# Patient Record
Sex: FEMALE | Race: BLACK OR AFRICAN AMERICAN | Employment: OTHER | URBAN - METROPOLITAN AREA
[De-identification: names, ages, dates, MRNs, and addresses within clinical notes are randomized per-mention and may not be internally consistent; named-entity substitution may affect disease eponyms.]

---

## 2017-07-11 ENCOUNTER — HOSPITAL ENCOUNTER (OUTPATIENT)
Dept: MAMMOGRAPHY | Age: 72
Discharge: HOME | End: 2017-07-11
Attending: INTERNAL MEDICINE
Payer: MEDICARE

## 2017-07-11 DIAGNOSIS — Z12.31 VISIT FOR SCREENING MAMMOGRAM: ICD-10-CM

## 2017-07-11 PROCEDURE — 77067 SCR MAMMO BI INCL CAD: CPT

## 2017-07-18 ENCOUNTER — OFFICE VISIT (OUTPATIENT)
Dept: CARDIOLOGY CLINIC | Age: 72
End: 2017-07-18

## 2017-07-18 VITALS
OXYGEN SATURATION: 99 % | WEIGHT: 157.8 LBS | SYSTOLIC BLOOD PRESSURE: 130 MMHG | RESPIRATION RATE: 16 BRPM | BODY MASS INDEX: 26.29 KG/M2 | HEIGHT: 65 IN | HEART RATE: 56 BPM | DIASTOLIC BLOOD PRESSURE: 70 MMHG

## 2017-07-18 DIAGNOSIS — Q61.3 POLYCYSTIC KIDNEY DISEASE: Chronic | ICD-10-CM

## 2017-07-18 DIAGNOSIS — I10 ESSENTIAL HYPERTENSION: ICD-10-CM

## 2017-07-18 DIAGNOSIS — I48.0 PAROXYSMAL ATRIAL FIBRILLATION (HCC): Primary | ICD-10-CM

## 2017-07-18 DIAGNOSIS — N18.9 CKD (CHRONIC KIDNEY DISEASE), UNSPECIFIED STAGE: ICD-10-CM

## 2017-07-18 NOTE — PROGRESS NOTES
Subjective/HPI:     Mac Roy is a 81 y.o. female is here for f/u appt. The patient denies chest pain/ shortness of breath, orthopnea, PND, LE edema, palpitations, syncope, presyncope or fatigue. Doing well. PCP Provider  Lon Balderas MD  Past Medical History:   Diagnosis Date    Arthritis     Chronic kidney disease     on transplant list    Gastrointestinal disorder     GERD    GERD (gastroesophageal reflux disease)     Hypertension     Polycystic kidney disease       Past Surgical History:   Procedure Laterality Date    ABDOMEN SURGERY PROC UNLISTED  9/15/15    Laparoscopic insertion of peritoneal dialysis catheter    HX GYN  1990    hysterectomy/[facility]/Dr Lamberto Frank     Allergies   Allergen Reactions    Ace Inhibitors Cough    Dihydro-Cp [Dihydrocodeine-Cpm-Pseudoephed] Unknown (comments)      Family History   Problem Relation Age of Onset    Hypertension Mother     Kidney Disease Mother     Cancer Mother      Lung & throat    Dementia Father     Diabetes Father     Cancer Sister      spinal      Current Outpatient Prescriptions   Medication Sig    aspirin (ASPIRIN) 325 mg tablet Take 325 mg by mouth daily.  labetalol (NORMODYNE) 200 mg tablet Take 200 mg by mouth three (3) times daily.  cloNIDine (CATAPRES) 0.2 mg/24 hr patch 1 Patch by TransDERmal route Every Saturday.  omeprazole (PRILOSEC OTC) 20 mg tablet Take 20 mg by mouth daily.  tacrolimus (PROGRAF) 1 mg capsule Take  by mouth every twelve (12) hours.  predniSONE (DELTASONE) 5 mg tablet Take 5 mg by mouth daily.  mycophenolate (CELLCEPT) 250 mg capsule Take 250 mg by mouth two (2) times a day.  potassium chloride (K-DUR, KLOR-CON) 20 mEq tablet Take  by mouth two (2) times a day. No current facility-administered medications for this visit.        Vitals:    07/18/17 1114 07/18/17 1115   BP: 130/80 130/70   Pulse: (!) 56    Resp: 16    SpO2: 99%    Weight: 157 lb 12.8 oz (71.6 kg)    Height: 5' 5\" (1.651 m)      Social History     Social History    Marital status:      Spouse name: N/A    Number of children: N/A    Years of education: N/A     Occupational History    Not on file. Social History Main Topics    Smoking status: Former Smoker     Packs/day: 0.25     Years: 12.00     Quit date: 8/19/1985    Smokeless tobacco: Never Used    Alcohol use No    Drug use: No    Sexual activity: Yes     Partners: Male     Other Topics Concern    Not on file     Social History Narrative       I have reviewed the nurses notes, vitals, problem list, allergy list, medical history, family, social history and medications. Review of Symptoms:    General: Pt denies excessive weight gain or loss. Pt is able to conduct ADL's  HEENT: Denies blurred vision, headaches, epistaxis and difficulty swallowing. Respiratory: Denies shortness of breath, GALAVIZ, wheezing or stridor. Cardiovascular: Denies precordial pain, palpitations, edema or PND  Gastrointestinal: Denies poor appetite, indigestion, abdominal pain or blood in stool  Urinary: Denies dysuria, pyuria  Musculoskeletal: Denies pain or swelling from muscles or joints  Neurologic: Denies tremor, paresthesias, or sensory motor disturbance  Skin: Denies rash, itching or texture change. Psych: Denies depression        Physical Exam:      General: Well developed, in no acute distress, cooperative and alert  HEENT: No carotid bruits, no JVD, trach is midline. Neck Supple, PEERL, EOM intact. Heart:  Normal S1/S2 negative S3 or S4. Irregular,+ADRIAN, no gallop or rub.   Respiratory: Clear bilaterally x 4, no wheezing or rales  Abdomen:   Soft, non-tender, no masses, bowel sounds are active.   Extremities:  No edema, normal cap refill, no cyanosis, atraumatic. Neuro: A&Ox3, speech clear, gait stable. Skin: Skin color is normal. No rashes or lesions.  Non diaphoretic  Vascular: 2+ pulses symmetric in all extremities    Cardiographics    ECG: SB, HR 57  When compared with EKG on 7/7/16 no significant changes noted    Results for orders placed or performed during the hospital encounter of 10/25/12   EKG, 12 LEAD, INITIAL   Result Value Ref Range    Ventricular Rate 70 BPM    Atrial Rate 60 BPM    QRS Duration 80 ms    Q-T Interval 414 ms    QTC Calculation (Bezet) 447 ms    Calculated R Axis 55 degrees    Calculated T Axis 26 degrees    Diagnosis       Atrial fibrillation  When compared with ECG of 27-OCT-2012 05:45,  No significant change was found  Confirmed by Bridgette Buenrostro ([id number]) on 10/28/2012 3:29:55 PM         Cardiology Labs:  No results found for: CHOL, CHOLX, CHLST, CHOLV, 571943, HDL, LDL, LDLC, DLDLP, Mary Grace Allison, CHHD, [facility]    Lab Results   Component Value Date/Time    Sodium 136 10/15/2013 11:21 AM    Potassium 4.3 10/15/2013 11:21 AM    Chloride 94 10/15/2013 11:21 AM    CO2 26 10/15/2013 11:21 AM    Anion gap 9 05/14/2013 11:38 AM    Glucose 98 10/15/2013 11:21 AM    BUN 39 10/15/2013 11:21 AM    Creatinine 2.87 10/15/2013 11:21 AM    BUN/Creatinine ratio 14 10/15/2013 11:21 AM    GFR est AA 19 10/15/2013 11:21 AM    GFR est non-AA 16 10/15/2013 11:21 AM    Calcium 9.3 10/15/2013 11:21 AM    AST (SGOT) 19 10/15/2013 11:21 AM    Alk. phosphatase 69 10/15/2013 11:21 AM    Protein, total 7.0 10/15/2013 11:21 AM    Albumin 4.5 10/15/2013 11:21 AM    Globulin 3.8 05/14/2013 11:38 AM    A-G Ratio 1.8 10/15/2013 11:21 AM    ALT (SGPT) 12 10/15/2013 11:21 AM           Assessment:     Assessment:     Dangelo Churchill was seen today for follow-up. Diagnoses and all orders for this visit:    Paroxysmal atrial fibrillation ([address]    Polycystic kidney disease  -     AMB POC EKG ROUTINE W/ 12 LEADS, INTER & REP    CKD (chronic kidney disease), unspecified stage        ICD-10-CM ICD-9-CM    1. Paroxysmal atrial fibrillation (HCC) I48.0 427.31    2.  Polycystic kidney disease Q61.3 753.12 AMB POC EKG ROUTINE W/ 12 LEADS, INTER & REP   3. CKD (chronic kidney disease), unspecified stage N18.9 585.9      Orders Placed This Encounter    AMB POC EKG ROUTINE W/ 12 LEADS, INTER & REP     Order Specific Question:   Reason for Exam:     Answer:   routine        Plan:   Pt presents today for her annual f/u appt and denies cardiac complaints.     1. HTN: remains at goal. No ACEI or HCTZ due to CKD. Continue current meds. 2. Paroxysmal atrial fib in setting of hypokalemia and hyponatremia: Denies palpitations. Remains in NSR. 3. CRI: f/u with Nephrology. 4. Diet and exercise.      F/U in one year    Esmer Sanchez NP      Pt seen and examined in details. Agree with NP A&P. D/w pt.      Juan Alberto Queen MD

## 2017-07-18 NOTE — MR AVS SNAPSHOT
Visit Information Date & Time Provider Department Dept. Phone Encounter #  
 7/18/2017 10:45 AM Markell Ledesma, [address] [facility] [phone] [id number] Follow-up Instructions Return in about 1 year (around 7/18/2018). Upcoming Health Maintenance Date Due Hepatitis C Screening 1945 DTaP/Tdap/Td series (1 - Tdap) 6/10/1968 FOBT Q 1 YEAR AGE 50-75 6/10/1997 ZOSTER VACCINE AGE 60> 6/10/2007 GLAUCOMA SCREENING Q2Y 6/10/2012 OSTEOPOROSIS SCREENING (DEXA) 6/10/2012 MEDICARE YEARLY EXAM 6/10/2012 INFLUENZA AGE 9 TO ADULT 8/1/2017 Pneumococcal 65+ Low/Medium Risk (2 of 2 - PPSV23) 10/1/2017 BREAST CANCER SCRN MAMMOGRAM 7/11/2019 Allergies as of 7/18/2017  Review Complete On: 7/18/2017 By: Markell Ledesma MD  
  
 Severity Noted Reaction Type Reactions Ace Inhibitors  10/12/2012    Cough Dihydro-cp [Dihydrocodeine-cpm-pseudoephed]  10/12/2012    Unknown (comments) Current Immunizations  Reviewed on 5/19/2015 Name Date Influenza Vaccine Split 10/1/2012 Pneumococcal Vaccine (Unspecified Type) 10/1/2012 Not reviewed this visit You Were Diagnosed With   
  
 Codes Comments Paroxysmal atrial fibrillation (HCC)    -  Primary ICD-10-CM: I48.0 ICD-9-CM: 427.31 Essential hypertension     ICD-10-CM: I10 
ICD-9-CM: 401.9 Polycystic kidney disease     ICD-10-CM: Q61.3 ICD-9-CM: 753.12 CKD (chronic kidney disease), unspecified stage     ICD-10-CM: N18.9 ICD-9-CM: 619. 9 Vitals BP Pulse Resp Height(growth percentile) Weight(growth percentile) SpO2  
 130/70 (BP 1 Location: Right arm, BP Patient Position: Sitting) (!) 56 16 5' 5\" (1.651 m) 157 lb 12.8 oz (71.6 kg) 99% BMI OB Status Smoking Status 26.26 kg/m2 Hysterectomy Former Smoker Vitals History BMI and BSA Data  Body Mass Index Body Surface Area  
 26.26 kg/m 2 1.81 m 2  
  
  
 Preferred Pharmacy Pharmacy Name Phone [facility] [address] [phone] Your Updated Medication List  
  
   
This list is accurate as of: 7/18/17 11:31 AM.  Always use your most recent med list.  
  
  
  
  
 aspirin 325 mg tablet Commonly known as:  ASPIRIN Take 325 mg by mouth daily. cloNIDine 0.2 mg/24 hr patch Commonly known as:  CATAPRES  
1 Patch by TransDERmal route Every Saturday. labetalol 200 mg tablet Commonly known as:  Spencer Talisha Take 200 mg by mouth three (3) times daily. mycophenolate mofetil 250 mg capsule Commonly known as:  CELLCEPT Take 250 mg by mouth two (2) times a day. omeprazole 20 mg tablet Commonly known as:  PRILOSEC OTC Take 20 mg by mouth daily. potassium chloride 20 mEq tablet Commonly known as:  K-DUR, KLOR-CON Take  by mouth two (2) times a day. predniSONE 5 mg tablet Commonly known as:  Jovon Fast Take 5 mg by mouth daily. tacrolimus 1 mg capsule Commonly known as:  PROGRAF Take  by mouth every twelve (12) hours. We Performed the Following AMB POC EKG ROUTINE W/ 12 LEADS, INTER & REP [16321 CPT(R)] Follow-up Instructions Return in about 1 year (around 7/18/2018). Introducing [facility]! Luke Rios introduces [employer] patient portal. Now you can access parts of your medical record, email your doctor's office, and request medication refills online. 1. In your internet browser, go to [email or web address] 2. Click on the First Time User? Click Here link in the Sign In box. You will see the New Member Sign Up page. 3. Enter your [employer] Access Code exactly as it appears below. You will not need to use this code after youve completed the sign-up process. If you do not sign up before the expiration date, you must request a new code.  
 
· [employer] Access Code: [email or web address] 
 Expires: 10/9/2017 10:33 AM 
 
4. Enter the last four digits of your Social Security Number (xxxx) and Date of Birth (mm/dd/yyyy) as indicated and click Submit. You will be taken to the next sign-up page. 5. Create a [employer] ID. This will be your [employer] login ID and cannot be changed, so think of one that is secure and easy to remember. 6. Create a [employer] password. You can change your password at any time. 7. Enter your Password Reset Question and Answer. This can be used at a later time if you forget your password. 8. Enter your e-mail address. You will receive e-mail notification when new information is available in [address] 9. Click Sign Up. You can now view and download portions of your medical record. 10. Click the Download Summary menu link to download a portable copy of your medical information. If you have questions, please visit the Frequently Asked Questions section of the [employer] website. Remember, [employer] is NOT to be used for urgent needs. For medical emergencies, dial 911. Now available from your iPhone and Android! Please provide this summary of care documentation to your next provider. Your primary care clinician is listed as Whitney Kuhn. If you have any questions after today's visit, please call [phone].

## 2018-07-31 ENCOUNTER — HOSPITAL ENCOUNTER (OUTPATIENT)
Dept: MAMMOGRAPHY | Age: 73
Discharge: HOME | End: 2018-07-31
Attending: INTERNAL MEDICINE
Payer: MEDICARE

## 2018-07-31 DIAGNOSIS — Z12.31 VISIT FOR SCREENING MAMMOGRAM: ICD-10-CM

## 2018-07-31 PROCEDURE — 77067 SCR MAMMO BI INCL CAD: CPT

## 2019-05-02 ENCOUNTER — OFFICE VISIT (OUTPATIENT)
Dept: CARDIOLOGY CLINIC | Age: 74
End: 2019-05-02

## 2019-05-02 VITALS
DIASTOLIC BLOOD PRESSURE: 98 MMHG | BODY MASS INDEX: 28.41 KG/M2 | RESPIRATION RATE: 18 BRPM | WEIGHT: 170.5 LBS | HEIGHT: 65 IN | OXYGEN SATURATION: 98 % | HEART RATE: 52 BPM | SYSTOLIC BLOOD PRESSURE: 160 MMHG

## 2019-05-02 DIAGNOSIS — I34.0 NON-RHEUMATIC MITRAL REGURGITATION: ICD-10-CM

## 2019-05-02 DIAGNOSIS — I48.0 PAROXYSMAL ATRIAL FIBRILLATION (HCC): Primary | ICD-10-CM

## 2019-05-02 DIAGNOSIS — I10 ESSENTIAL HYPERTENSION: ICD-10-CM

## 2019-05-02 NOTE — PROGRESS NOTES
1. Have you been to the ER, urgent care clinic since your last visit? Hospitalized since your last visit? No.    2. Have you seen or consulted any other health care providers outside of the [address] [place] [address] since your last visit? Include any pap smears or colon screening. ENT Dileep Sands NP-[facility].       Chief Complaint   Patient presents with    Follow-up     last seen in 2017- seen by ENT today and noted irregular heartbeat-pt denies any cardiac symptoms

## 2019-05-02 NOTE — PROGRESS NOTES
JEANNIE Shanks-BC    Subjective/HPI:     Ms. Roy is a 72 y.o. female is here for overdue f/u. She has a PMHx of PAF, mitral regurgitation, HTN, HLD and polycystic kidney disease s/p kidney transplant. She is doing well. She denies complaints of chest pains, dizziness, orthopnea, PND or edema. She denies shortness of breath or palpitation symptoms. She checks BP at home 2x/weekly. Home readings average 130s/80s. She continues to follow with nephrology and transplant physician at [facility].          PCP Provider  Deepa Hummel MD  Past Medical History:   Diagnosis Date    Arthritis     Chronic kidney disease     on transplant list    Gastrointestinal disorder     GERD    GERD (gastroesophageal reflux disease)     Hypertension     Polycystic kidney disease       Past Surgical History:   Procedure Laterality Date    ABDOMEN SURGERY PROC UNLISTED  9/15/15    Laparoscopic insertion of peritoneal dialysis catheter    HX GYN      hysterectomy/[facility]/Dr Christina Mckeon     Family History   Problem Relation Age of Onset    Hypertension Mother     Kidney Disease Mother     Cancer Mother         Lung & throat    Dementia Father     Diabetes Father     Cancer Sister         spinal     Social History     Socioeconomic History    Marital status:      Spouse name: Not on file    Number of children: Not on file    Years of education: Not on file    Highest education level: Not on file   Occupational History    Not on file   Social Needs    Financial resource strain: Not on file    Food insecurity:     Worry: Not on file     Inability: Not on file    Transportation needs:     Medical: Not on file     Non-medical: Not on file   Tobacco Use    Smoking status: Former Smoker     Packs/day: 0.25     Years: 12.00     Pack years: 3.00     Types: Cigarettes     Last attempt to quit: 1985     Years since quittin.7    Smokeless tobacco: Never Used Substance and Sexual Activity    Alcohol use: No    Drug use: No    Sexual activity: Yes     Partners: Male   Lifestyle    Physical activity:     Days per week: Not on file     Minutes per session: Not on file    Stress: Not on file   Relationships    Social connections:     Talks on phone: Not on file     Gets together: Not on file     Attends Hoahaoism service: Not on file     Active member of club or organization: Not on file     Attends meetings of clubs or organizations: Not on file     Relationship status: Not on file    Intimate partner violence:     Fear of current or ex partner: Not on file     Emotionally abused: Not on file     Physically abused: Not on file     Forced sexual activity: Not on file   Other Topics Concern    Not on file   Social History Narrative    Not on file       Allergies   Allergen Reactions    Ace Inhibitors Cough    Dihydro-Cp [Dihydrocodeine-Cpm-Pseudoephed] Unknown (comments)        Current Outpatient Medications   Medication Sig    aspirin (ASPIRIN) 325 mg tablet Take 325 mg by mouth daily.  labetalol (NORMODYNE) 200 mg tablet Take 200 mg by mouth three (3) times daily.  cloNIDine (CATAPRES) 0.2 mg/24 hr patch 1 Patch by TransDERmal route Every Saturday.  omeprazole (PRILOSEC OTC) 20 mg tablet Take 20 mg by mouth daily.  tacrolimus (PROGRAF) 1 mg capsule Take  by mouth every twelve (12) hours. Indications: 2 caps in am and 3 caps in pm    predniSONE (DELTASONE) 5 mg tablet Take 5 mg by mouth daily.  mycophenolate (CELLCEPT) 250 mg capsule Take 250 mg by mouth two (2) times a day.  potassium chloride (K-DUR, KLOR-CON) 20 mEq tablet Take  by mouth two (2) times a day. No current facility-administered medications for this visit. I have reviewed the problem list, allergy list, medical history, family, social history and medications. Review of Symptoms:    Review of Systems   Constitutional: Negative for chills, fever and weight loss. HENT: Negative for nosebleeds. Eyes: Negative for blurred vision and double vision. Respiratory: Negative for cough, shortness of breath and wheezing. Cardiovascular: Negative for chest pain, palpitations, orthopnea, leg swelling and PND. Gastrointestinal: Negative for abdominal pain, blood in stool, diarrhea, nausea and vomiting. Musculoskeletal: Negative for joint pain. Skin: Negative for rash. Neurological: Negative for dizziness, tingling and loss of consciousness. Endo/Heme/Allergies: Does not bruise/bleed easily. Physical Exam:      General: Well developed, in no acute distress, cooperative and alert  HEENT: No carotid bruits, no JVD, trach is midline. Neck Supple, PEERL, EOM intact. Heart:  reg rate and rhythm; normal S1/S2; no murmurs, gallops or rubs. Respiratory: Clear bilaterally x 4, no wheezing or rales  Abdomen:   Soft, non-tender, no distention, no masses. + BS. Extremities:  Normal cap refill, no cyanosis, atraumatic. No edema. Neuro: A&Ox3, speech clear, gait stable. Skin: Skin color is normal. No rashes or lesions.  Non diaphoretic  Vascular: 2+ pulses symmetric in all extremities    Vitals:    05/02/19 1511 05/02/19 1526   BP: (!) 168/100 (!) 160/98   Pulse: (!) 52    Resp: 18    SpO2: 98%    Weight: 170 lb 8 oz (77.3 kg)    Height: 5' 5\" (1.651 m)        Cardiographics    ECG: sinus bradycardia with PACs  Results for orders placed or performed during the hospital encounter of 10/25/12   EKG, 12 LEAD, INITIAL   Result Value Ref Range    Ventricular Rate 70 BPM    Atrial Rate 60 BPM    QRS Duration 80 ms    Q-T Interval 414 ms    QTC Calculation (Bezet) 447 ms    Calculated R Axis 55 degrees    Calculated T Axis 26 degrees    Diagnosis       Atrial fibrillation  When compared with ECG of 27-OCT-2012 05:45,  No significant change was found  Confirmed by Melia Alicea ([id number]) on 10/28/2012 3:29:55 PM       Cardiology Labs:  No results found for: CHOL, CHOLX, CHLST, Wendall Delay, HDL, LDL, LDLC, DLDLP, TGLX, TRIGL, TRIGP, CHHD, CHHDX    Lab Results   Component Value Date/Time    Sodium 136 10/15/2013 11:21 AM    Potassium 4.3 10/15/2013 11:21 AM    Chloride 94 (L) 10/15/2013 11:21 AM    CO2 26 10/15/2013 11:21 AM    Anion gap 9 05/14/2013 11:38 AM    Glucose 98 10/15/2013 11:21 AM    BUN 39 (H) 10/15/2013 11:21 AM    Creatinine 2.87 (H) 10/15/2013 11:21 AM    BUN/Creatinine ratio 14 10/15/2013 11:21 AM    GFR est AA 19 (L) 10/15/2013 11:21 AM    GFR est non-AA 16 (L) 10/15/2013 11:21 AM    Calcium 9.3 10/15/2013 11:21 AM    Bilirubin, total 0.3 10/15/2013 11:21 AM    AST (SGOT) 19 10/15/2013 11:21 AM    Alk. phosphatase 69 10/15/2013 11:21 AM    Protein, total 7.0 10/15/2013 11:21 AM    Albumin 4.5 10/15/2013 11:21 AM    Globulin 3.8 05/14/2013 11:38 AM    A-G Ratio 1.8 10/15/2013 11:21 AM    ALT (SGPT) 12 10/15/2013 11:21 AM           Assessment:     Assessment:       ICD-10-CM ICD-9-CM    1. Paroxysmal atrial fibrillation (HCC) I48.0 427.31 AMB POC EKG ROUTINE W/ 12 LEADS, INTER & REP   2. Non-rheumatic mitral regurgitation I34.0 424.0 ECHO ADULT COMPLETE   3. Essential hypertension I10 401.9         Plan:     1. Paroxysmal atrial fibrillation (HCC)  No recurrence of A fib; continue labetolol and ASA therapy. 2. Non-rheumatic mitral regurgitation  Echo in 10/2012 with preserved LVEF 60% with mild-mod MR and mildly dilated RV  Continue present medical management  Repeat echocardiogram.    3. Essential hypertension  BP well controlled at home. Continue with present medical management. Could not be on ACEi/ARB previously due to ESRD, but since renal transplant reports serum Cr 1.1-1.2. Norberto Morris NP      Patient seen and examined by me with nurse practitioner. Kayla Callahan personally performed all components of the history, physical, and medical decision making and agree with the assessment and plan with minor modifications as noted.     - Previously paroxysmal atrial fib in setting of hypokalemia and hyponatremia.   - s/p renal transplant    Scott Walters MD

## 2019-05-23 ENCOUNTER — TELEPHONE (OUTPATIENT)
Dept: CARDIOLOGY CLINIC | Age: 74
End: 2019-05-23

## 2019-05-23 NOTE — TELEPHONE ENCOUNTER
----- Message from Abby Abarca NP sent at 5/23/2019  8:17 AM EDT -----  Cite Ra Cruz,    Please call the patient and inform her echocardiogram showed normal ejection fraction 60-65%. No concern for heart failure; her leaky mitral valve is not leaking as much anymore, which is great.     Thanks,  [employer]

## 2019-08-08 ENCOUNTER — HOSPITAL ENCOUNTER (OUTPATIENT)
Dept: MAMMOGRAPHY | Age: 74
Discharge: HOME | End: 2019-08-08
Attending: INTERNAL MEDICINE
Payer: MEDICARE

## 2019-08-08 DIAGNOSIS — Z12.39 SCREENING BREAST EXAMINATION: ICD-10-CM

## 2019-08-08 PROCEDURE — 77067 SCR MAMMO BI INCL CAD: CPT

## 2020-11-25 ENCOUNTER — HOSPITAL ENCOUNTER (OUTPATIENT)
Dept: MAMMOGRAPHY | Age: 75
Discharge: HOME | End: 2020-11-25
Attending: INTERNAL MEDICINE
Payer: MEDICARE

## 2020-11-25 DIAGNOSIS — Z12.31 VISIT FOR SCREENING MAMMOGRAM: ICD-10-CM

## 2020-11-25 PROCEDURE — 77067 SCR MAMMO BI INCL CAD: CPT

## 2021-01-13 ENCOUNTER — VIRTUAL VISIT (OUTPATIENT)
Dept: ENDOCRINOLOGY | Age: 76
End: 2021-01-13
Payer: MEDICARE

## 2021-01-13 DIAGNOSIS — M85.9 DISORDER OF BONE DENSITY AND STRUCTURE, UNSPECIFIED: ICD-10-CM

## 2021-01-13 DIAGNOSIS — Z94.0 HISTORY OF RENAL TRANSPLANT: ICD-10-CM

## 2021-01-13 DIAGNOSIS — M85.80 OSTEOPENIA, UNSPECIFIED LOCATION: Primary | ICD-10-CM

## 2021-01-13 PROCEDURE — 99204 OFFICE O/P NEW MOD 45 MIN: CPT | Performed by: INTERNAL MEDICINE

## 2021-01-13 RX ORDER — DICLOFENAC SODIUM 10 MG/G
GEL TOPICAL
COMMUNITY
End: 2021-03-01 | Stop reason: ALTCHOICE

## 2021-01-13 NOTE — PROGRESS NOTES
Chief Complaint   Patient presents with    Osteopenia      Notes are in CC  DOXY:  [email or web address]    New Patient    Other     [facility]     History of Present Illness: Silva Roy is a 70 y.o. female with a past medical history significant for autosomal dominant polycystic kidney disease status post kidney transplant 01/2016 presenting in referral from Jamey Smith MD for discussion related to osteopenia. Is taking prednisone 5mg daily, has been taking since transplant. Previously took fosamax, PCP recommended she stop it in 2011- at that time had been taking it for >5 years. Menarche occurred age 14, menopause in her 45s. Creatinine was 1.2 on most recent assessment, last labs were in November. Does not take vitamin D. No hx of fractures, had sprain last fall of R ankle. Past Medical History:   Diagnosis Date    Arthritis     Chronic kidney disease     on transplant list    Gastrointestinal disorder     GERD    GERD (gastroesophageal reflux disease)     Hypertension     Osteopenia     Polycystic kidney disease      Past Surgical History:   Procedure Laterality Date    HX GYN  1990    [facility]Dr Titus Siegel    HX HYSTERECTOMY      HX RENAL TRANSPLANT  01/06/2016    [place] ABDOMEN SURGERY PROC UNLISTED  9/15/15    Laparoscopic insertion of peritoneal dialysis catheter     Current Outpatient Medications   Medication Sig    aspirin (ASPIRIN) 325 mg tablet Take 325 mg by mouth daily.  labetalol (NORMODYNE) 200 mg tablet Take 200 mg by mouth three (3) times daily.  cloNIDine (CATAPRES) 0.2 mg/24 hr patch 1 Patch by TransDERmal route Every Saturday.  omeprazole (PRILOSEC OTC) 20 mg tablet Take 20 mg by mouth daily.  tacrolimus (PROGRAF) 1 mg capsule Take  by mouth every twelve (12) hours. Indications: 2 caps in am and 3 caps in pm    predniSONE (DELTASONE) 5 mg tablet Take 5 mg by mouth daily.     mycophenolate (CELLCEPT) 250 mg capsule Take 250 mg by mouth two (2) times a day.  potassium chloride (K-DUR, KLOR-CON) 20 mEq tablet Take  by mouth two (2) times a day.  diclofenac (VOLTAREN) 1 % gel diclofenac 1 % topical gel     No current facility-administered medications for this visit. Allergies   Allergen Reactions    Ace Inhibitors Cough    Dihydro-Cp [Dihydrocodeine-Cpm-Pseudoephed] Unknown (comments)     Family History   Problem Relation Age of Onset    Hypertension Mother     Kidney Disease Mother     Cancer Mother         Lung & throat    Dementia Father     Diabetes Father     Cancer Sister         spinal    Kidney Disease Sister     Kidney Disease Sister        Social Hx: smoked when younger- stopped in 35s  Lives closer to [employer]     Review of Systems:  - Constitutional Symptoms: no fevers, chills, weight loss  - Eyes: no blurry vision or double vision  - Cardiovascular: no chest pain or palpitations  - Respiratory: no cough or shortness of breath  - Gastrointestinal: no dysphagia or abdominal pain  - Musculoskeletal: no joint pains or weakness  - Integumentary: no rashes  - Neurological: no numbness, tingling, or headaches  - Psychiatric: no depression or anxiety  - Endocrine: no heat or cold intolerance, no polyuria or polydipsia    - Physical Examination:  - - GENERAL: NCAT, Appears well nourished   - EYES: EOMI, non-icteric, no proptosis   - Ear/Nose/Throat: NCAT, no visible inflammation or masses   - CARDIOVASCULAR: no cyanosis, no visible JVD   - RESPIRATORY: respiratory effort normal without any distress or labored breathing   - MUSCULOSKELETAL: Normal ROM of neck and upper extremities observed   - SKIN: No rash on face  - NEUROLOGIC:  No facial asymmetry (Cranial nerve 7 motor function), No gaze palsy   - PSYCHIATRIC: Normal affect, Normal insight and judgement     Data Reviewed:       11/2020 DEXA:    Assessment/Plan:  This is a very pleasant 71 yo female with a past medical history significant for autosomal dominant polycystic kidney disease status post renal transplant in January 2016 presenting in referral from Fe Gross MD for discussion related to medication management of this condition. Priya Giles currently is taking prednisone. Her 10-year risk of hip fracture is 11.8%; we treat osteopenia when this risk exceeds 3%. Will rule out secondary causes of osteoporosis with TSH, SPEP, 25-hydroxy vitamin D, and obtain CMP to determine if we want to use Prolia versus Reclast.  Will use Prolia if creatinine clearance is lower than 35. Hopefully, it is not and we can use Reclast every 18 months for 5 years. Previous history of bisphosphonate use does not increase risk of atypical femoral fracture as she has had a 5-year drug holiday. #Osteopenia with current prednisone use, 10-year risk of hip fracture 11%  -Treatment is warranted given 10-year risk of hip fracture 11%  -Obtain the following labs:   - VITAMIN D, 25 HYDROXY  - METABOLIC PANEL, COMPREHENSIVE  - ALBUMIN  - PTH INTACT  - PROTEIN ELECTROPHORESIS  - TSH 3RD GENERATION  - VITAMIN D, 25 HYDROXY  -Discussed rare but real risk of osteonecrosis of the jaw with bisphosphonate use, encouraged her to make an appointment with her dentist which she already has scheduled for February 11    Calcium Content of Selected Foods    Dairy and Soy Amount Calcium (mg)   Milk (skim, low fat, whole) 1 cup 300   Buttermilk 1 cup [facility] [facility] . 5 cup 65   Ice Cream or Ice Milk . 5 cup 100   Sour Cream, cultured 1 cup 250   Soy Milk, calcium fortified 1 cup 200 to 400   Yogurt 1 cup 450   Yogurt drink 12 oz 300   [place] Instant Breakfast 1 packet 250   Hot Cocoa, calcium fortified 1 packet 320   Nonfat dry milk powder 5 Tbsp 300   Brie Cheese 1 oz 50   Hard Cheese (cheddar, antonio) 1 oz 200   Mozzarella 1 oz 200   Parmesan Cheese 1 Tbsp 70   Swiss or Gruyere 1 oz 270     Vegetables   [place] squash, cooked 1 cup 90   Arugula, raw 1 cup 125   Bok Godfrey, raw 1 cup 40   Broccoli, cooked 1 cup 180   Chard or Okra, cooked 1 cup 100   Chicory (curly endive), raw 1 cup 40   Priscila greens 1 cup 50   Corn, brine packed 1 cup 10   Dandelion greens, raw 1 cup 80   Kale, raw 1 cup 55   Kelp or Kombe 1 cup 60   Mustard greens 1 cup 40   Spinach, cooked 1 cup 240   Turnip greens, raw 1 cup 80     Fruits   Figs, dried, uncooked 1 cup 300   Kiwi, raw 1 cup 50   Orange juice, calcium fortified 8 oz 300   Orange juice, from concentrate 1 cup 20     Legumes   Garbanzo Beans, cooked 1 cup 80   Legumes, general, cooked . 5 cup 15 to 50   [place] Beans, cooked 1 cup 75   Soybeans, boiled . 5 cup 100   Temphe . 5 cup 75   Tofu, firm, calcium set 4 oz 250 to 750   Tofu, soft regular 4 oz 120 to 390   White Beans, cooked . 5 cup 70     Grains   Cereals (calcium fortified) . 5 to 1 cup 250 to 1000   Amaranth, cooked . 5 cup 135   Bread, calcium fortified 1 slice 393 to 840   Brown rice, long grain, raw 1 cup 50   Oatmeal, instant 1 package 100 to 150   Tortillas, corn 2 85     Nuts and Seeds   Almonds, toasted unblanched 1 oz 80   Sesame seeds, whole roasted 1 oz 280   Sesame tahini 1 oz (2 Tbsp) 130   Sunflower seeds, dried 1 oz 50     Fish   Mackerel, canned 3 oz 250   [place], canned, with bones 3 oz 170 to 210   Sardines 3 oz 370     Other   Molasses, blackstrap 1 Tbsp 135   * When range is given, calcium content varies by product. * The calcium content of plant foods is varied. Most vegetables, legumes, nuts, seeds, and dried fruit contain some calcium. Listed are selected significant sources of well-absorbed calcium. References:   Go[employer], Handbook 8 palm program    Mini and Mandaeism     How Much Do You Need?    Age Calcium (mg)   [address] [facility]- 1year old 500 mg   3- 6year old 800 mg   5- 25year old 1300 mg   23- 48year old 1000 mg   46- 75 year old 1200 mg   > 79year old 1200 mg     [email or web address]      Copy sent to:Lane Meadows MD     Return to care Jan 27th 3:10      Jacy Bowie is a 70 y.o. female being evaluated by a Virtual Visit (video visit) encounter to address concerns as mentioned above. A caregiver was present when appropriate. Due to this being a TeleHealth encounter ([facility] public health emergency), evaluation of the following organ systems was limited:     Vitals/Constitutional/EENT/Resp/CV/GI//MS/Neuro/Skin/Heme-Lymph-Imm. Pursuant to the emergency declaration under the [address] [place] [address] and the [employer] and [employer] Act, this Virtual Visit was conducted with patient's (and/or legal guardian's) consent, to reduce the risk of exposure to COVID-19 and provide necessary medical care. Services were provided through a video synchronous discussion virtually to substitute for in-person encounter. --William De Los Santos MD on 1/13/2021 at 1:42 PM    An electronic signature was used to authenticate this note.

## 2021-01-19 RX ORDER — ERGOCALCIFEROL 1.25 MG/1
50000 CAPSULE ORAL
Qty: 16 CAP | Refills: 0 | Status: SHIPPED | OUTPATIENT
Start: 2021-01-19 | End: 2022-01-14

## 2021-01-20 LAB
25(OH)D3+25(OH)D2 SERPL-MCNC: 16.1 NG/ML (ref 30–100)
ALBUMIN SERPL ELPH-MCNC: 3.8 G/DL (ref 2.9–4.4)
ALBUMIN SERPL-MCNC: 4.3 G/DL (ref 3.7–4.7)
ALBUMIN/GLOB SERPL: 1.5 {RATIO} (ref 0.7–1.7)
ALBUMIN/GLOB SERPL: 2 {RATIO} (ref 1.2–2.2)
ALP SERPL-CCNC: 113 IU/L (ref 39–117)
ALPHA1 GLOB SERPL ELPH-MCNC: 0.2 G/DL (ref 0–0.4)
ALPHA2 GLOB SERPL ELPH-MCNC: 0.7 G/DL (ref 0.4–1)
ALT SERPL-CCNC: 14 IU/L (ref 0–32)
AST SERPL-CCNC: 16 IU/L (ref 0–40)
B-GLOBULIN SERPL ELPH-MCNC: 1.1 G/DL (ref 0.7–1.3)
BILIRUB SERPL-MCNC: 0.7 MG/DL (ref 0–1.2)
BUN SERPL-MCNC: 13 MG/DL (ref 8–27)
BUN/CREAT SERPL: 10 (ref 12–28)
CALCIUM SERPL-MCNC: 9.4 MG/DL (ref 8.7–10.3)
CHLORIDE SERPL-SCNC: 105 MMOL/L (ref 96–106)
CO2 SERPL-SCNC: 26 MMOL/L (ref 20–29)
CREAT SERPL-MCNC: 1.33 MG/DL (ref 0.57–1)
GAMMA GLOB SERPL ELPH-MCNC: 0.7 G/DL (ref 0.4–1.8)
GLOBULIN SER CALC-MCNC: 2.1 G/DL (ref 1.5–4.5)
GLOBULIN SER CALC-MCNC: 2.6 G/DL (ref 2.2–3.9)
GLUCOSE SERPL-MCNC: 118 MG/DL (ref 65–99)
M PROTEIN SERPL ELPH-MCNC: NORMAL G/DL
PLEASE NOTE, 011150: NORMAL
POTASSIUM SERPL-SCNC: 4.4 MMOL/L (ref 3.5–5.2)
PROT SERPL-MCNC: 6.4 G/DL (ref 6–8.5)
PTH-INTACT SERPL-MCNC: 122 PG/ML (ref 15–65)
SODIUM SERPL-SCNC: 141 MMOL/L (ref 134–144)
TSH SERPL DL<=0.005 MIU/L-ACNC: 2.74 UIU/ML (ref 0.45–4.5)

## 2021-01-27 ENCOUNTER — VIRTUAL VISIT (OUTPATIENT)
Dept: ENDOCRINOLOGY | Age: 76
End: 2021-01-27
Payer: MEDICARE

## 2021-01-27 DIAGNOSIS — M85.9 DISORDER OF BONE DENSITY AND STRUCTURE, UNSPECIFIED: ICD-10-CM

## 2021-01-27 DIAGNOSIS — R79.89 LOW VITAMIN D LEVEL: ICD-10-CM

## 2021-01-27 DIAGNOSIS — E34.9 INCREASED PTH LEVEL: ICD-10-CM

## 2021-01-27 DIAGNOSIS — M85.80 OSTEOPENIA, UNSPECIFIED LOCATION: Primary | ICD-10-CM

## 2021-01-27 PROCEDURE — 99214 OFFICE O/P EST MOD 30 MIN: CPT | Performed by: INTERNAL MEDICINE

## 2021-01-27 NOTE — PROGRESS NOTES
Chief Complaint   Patient presents with    Osteopenia     Bony; [email or web address]    Labs    Follow-up     History of Present Illness: Cedrick Roy is a 70 y.o. female with a past medical history significant for autosomal dominant polycystic kidney disease status post kidney transplant 01/2016 presenting in follow up for discussion related to osteopenia.     01/13/2021:  Is taking prednisone 5mg daily, has been taking since transplant. Previously took fosamax, PCP recommended she stop it in 2011- at that time had been taking it for >5 years. Menarche occurred age 14, menopause in her 45s. Creatinine was 1.2 on most recent assessment, last labs were in November. Does not take vitamin D. No hx of fractures, had sprain last fall of R ankle. 01/27/2021:  Took first vitamin D pill last week. Was wondering why her PTH is high. States that her creatinine is typically 1.2. Took an oral bisphosphonate many years ago. Is concerned about her fasting blood glucose level on the recent labs. Does exercise around her house for 30 minutes. Is trying to watch sweets. Current Outpatient Medications   Medication Sig    ergocalciferol (ERGOCALCIFEROL) 1,250 mcg (50,000 unit) capsule Take 1 Cap by mouth every seven (7) days for 360 days.  aspirin (ASPIRIN) 325 mg tablet Take 325 mg by mouth daily.  labetalol (NORMODYNE) 200 mg tablet Take 200 mg by mouth three (3) times daily.  cloNIDine (CATAPRES) 0.2 mg/24 hr patch 1 Patch by TransDERmal route Every Thursday.  omeprazole (PRILOSEC OTC) 20 mg tablet Take 20 mg by mouth daily.  tacrolimus (PROGRAF) 1 mg capsule Take  by mouth every twelve (12) hours. Indications: 2 caps in am and 3 caps in pm    predniSONE (DELTASONE) 5 mg tablet Take 5 mg by mouth daily.  mycophenolate (CELLCEPT) 250 mg capsule Take 250 mg by mouth two (2) times a day.  potassium chloride (K-DUR, KLOR-CON) 20 mEq tablet Take  by mouth two (2) times a day.     diclofenac (VOLTAREN) 1 % gel diclofenac 1 % topical gel     No current facility-administered medications for this visit. Social Hx: smoked when younger- stopped in 35s  Lives closer to [facility]     Review of Systems:  - Constitutional Symptoms: no fevers, chills, weight loss  - Eyes: no blurry vision or double vision  - Cardiovascular: no chest pain or palpitations  - Respiratory: no cough or shortness of breath  - Gastrointestinal: no dysphagia or abdominal pain  - Musculoskeletal: no joint pains or weakness  - Integumentary: no rashes  - Neurological: no numbness, tingling, or headaches  - Psychiatric: no depression or anxiety  - Endocrine: no heat or cold intolerance, no polyuria or polydipsia    - Physical Examination:  - - GENERAL: NCAT, Appears well nourished   - EYES: EOMI, non-icteric, no proptosis   - Ear/Nose/Throat: NCAT, no visible inflammation or masses   - CARDIOVASCULAR: no cyanosis, no visible JVD   - RESPIRATORY: respiratory effort normal without any distress or labored breathing   - MUSCULOSKELETAL: Normal ROM of neck and upper extremities observed   - SKIN: No rash on face  - NEUROLOGIC:  No facial asymmetry (Cranial nerve 7 motor function), No gaze palsy   - PSYCHIATRIC: Normal affect, Normal insight and judgement     Data Reviewed:       11/2020 DEXA:      Results for Manuel Bone (MRN [id number]) as of 1/27/2021 14:25   Ref.  Range 1/18/2021 09:58   Sodium Latest Ref Range: 134 - 144 mmol/L 141   Potassium Latest Ref Range: 3.5 - 5.2 mmol/L 4.4   Chloride Latest Ref Range: 96 - 106 mmol/L 105   CO2 Latest Ref Range: 20 - 29 mmol/L 26   Glucose Latest Ref Range: 65 - 99 mg/dL 118 (H)   BUN Latest Ref Range: 8 - 27 mg/dL 13   Creatinine Latest Ref Range: 0.57 - 1.00 mg/dL 1.33 (H)   BUN/Creatinine ratio Latest Ref Range: 12 - 28  10 (L)   Calcium Latest Ref Range: 8.7 - 10.3 mg/dL 9.4   GFR est non-AA Latest Ref Range: >59 mL/min/1.73 40 (L)   GFR est AA Latest Ref Range: >59 mL/min/1.73 46 (L)   Bilirubin, total Latest Ref Range: 0.0 - 1.2 mg/dL 0.7   Protein, total Latest Ref Range: 6.0 - 8.5 g/dL 6.4   Albumin Latest Ref Range: 3.7 - 4.7 g/dL 4.3   A-G Ratio Latest Ref Range: 1.2 - 2.2  2.0   A/G ratio Latest Ref Range: 0.7 - 1.7  1.5   ALT Latest Ref Range: 0 - 32 IU/L 14   AST Latest Ref Range: 0 - 40 IU/L 16   Alk. phosphatase Latest Ref Range: 39 - 117 IU/L 113   TSH Latest Ref Range: 0.450 - 4.500 uIU/mL 2.740     Results for Nayeli Lopez (MRN [id number]) as of 1/27/2021 14:25   Ref. Range 1/18/2021 09:58   VITAMIN D, 25-HYDROXY Latest Ref Range: 30.0 - 100.0 ng/mL 16.1 (L)     Assessment/Plan: This is a very pleasant 69 yo female with a past medical history significant for autosomal dominant polycystic kidney disease status post renal transplant in January 2016 presenting in follow-up for discussion related to medication management of this condition. Kaitlyn Noyola currently is taking prednisone. Her 10-year risk of hip fracture is 11.8%; we treat osteopenia when this risk exceeds 3%. Her vitamin D was significantly low at 16.1. I will load her up with 50,000 international units weekly for 8 weeks and reassess this. We will also reassess PTH following normalization of vitamin D but do suspect that there is some degree of secondary hyperparathyroidism. Creatinine clearance Via Cockcroft-Gault equation is 48 mL/min. (Cutoff for Reclast use is 35 mL/min) may consider potential of adynamic bone disease if PTH remains elevated. Previous history of bisphosphonate use does not increase risk of atypical femoral fracture as she has had a 5-year drug holiday.     #Osteopenia with current prednisone use, 10-year risk of hip fracture 11%  -Treatment is warranted given 10-year risk of hip fracture 11%  -Supplement vitamin D with 50,000 international units once weekly for 8 weeks  -Reassess both vitamin D and PTH levels following this load  -Creatinine clearance is 48 mL/min, anticipate reclass to use  -Discussed rare but real risk of osteonecrosis of the jaw with bisphosphonate use, encouraged her to make an appointment with her dentist which she already has scheduled for February 11    Calcium Content of Selected Foods    Dairy and Soy Amount Calcium (mg)   Milk (skim, low fat, whole) 1 cup 300   Buttermilk 1 cup [address] . 5 cup 65   Ice Cream or Ice Milk . 5 cup 100   Sour Cream, cultured 1 cup 250   Soy Milk, calcium fortified 1 cup 200 to 400   Yogurt 1 cup 450   Yogurt drink 12 oz 300   [place] Instant Breakfast 1 packet 250   Hot [place]oa, calcium fortified 1 packet 320   Nonfat dry milk powder 5 Tbsp 300   Brie Cheese 1 oz 50   Hard Cheese (cheddar, antonio) 1 oz 200   Mozzarella 1 oz 200   Parmesan Cheese 1 Tbsp 70   Swiss or Gruyere 1 oz 270     Vegetables   [place] squash, cooked 1 cup 90   Arugula, raw 1 cup 125   Bok Godfrey, raw 1 cup 40   Broccoli, cooked 1 cup 180   Chard or Okra, cooked 1 cup 100   Chicory (curly endive), raw 1 cup 40   Priscila greens 1 cup 50   Corn, brine packed 1 cup 10   Dandelion greens, raw 1 cup 80   Kale, raw 1 cup 55   Kelp or Kombe 1 cup 60   Mustard greens 1 cup 40   Spinach, cooked 1 cup 240   Turnip greens, raw 1 cup 80     Fruits   Figs, dried, uncooked 1 cup 300   Kiwi, raw 1 cup 50   Orange juice, calcium fortified 8 oz 300   Orange juice, from concentrate 1 cup 20     Legumes   Garbanzo Beans, cooked 1 cup 80   Legumes, general, cooked . 5 cup 15 to 50   [place] Beans, cooked 1 cup 75   Soybeans, boiled . 5 cup 100   Temphe . 5 cup 75   Tofu, firm, calcium set 4 oz 250 to 750   Tofu, soft regular 4 oz 120 to 390   White Beans, cooked . 5 cup 70     Grains   Cereals (calcium fortified) . 5 to 1 cup 250 to 1000   Amaranth, cooked . 5 cup 135   Bread, calcium fortified 1 slice 272 to 635   Brown rice, long grain, raw 1 cup 50   Oatmeal, instant 1 package 100 to 150   Tortillas, corn 2 85     Nuts and Seeds   Almonds, toasted unblanched 1 oz 80   Sesame seeds, whole roasted 1 oz 280   Sesame tahini 1 oz (2 Tbsp) 130   Sunflower seeds, dried 1 oz 50     Fish   Mackerel, canned 3 oz 250   Ray, canned, with bones 3 oz 170 to 210   Sardines 3 oz 370     Other   Molasses, blackstrap 1 Tbsp 135   * When range is given, calcium content varies by product. * The calcium content of plant foods is varied. Most vegetables, legumes, nuts, seeds, and dried fruit contain some calcium. Listed are selected significant sources of well-absorbed calcium. References:   [employer], Handbook 8 palm program    Mini and Rastafarian     How Much Do You Need? Age Calcium (mg)   3- 1year old 500 mg   3- 6year old 800 mg   5- 25year old 1300 mg   23- 48year old 1000 mg   46- 75 year old 1200 mg   > 79year old 1200 mg     [email or web address]      Copy sent to:Libia Meadows MD     Return to care pending vitamin D result      Kisha Roy is a 70 y.o. female being evaluated by a Virtual Visit (video visit) encounter to address concerns as mentioned above. A caregiver was present when appropriate. Due to this being a TeleHealth encounter (During [facility] public health emergency), evaluation of the following organ systems was limited:     Vitals/Constitutional/EENT/Resp/CV/GI//MS/Neuro/Skin/Heme-Lymph-Imm. Pursuant to the emergency declaration under the [address] [place] [address] and the [employer] and [employer] Act, this Virtual Visit was conducted with patient's (and/or legal guardian's) consent, to reduce the risk of exposure to COVID-19 and provide necessary medical care. Services were provided through a video synchronous discussion virtually to substitute for in-person encounter. --William De Los Santos MD on 1/27/2021 at 1:42 PM    An electronic signature was used to authenticate this note.

## 2021-03-01 ENCOUNTER — OFFICE VISIT (OUTPATIENT)
Dept: CARDIOLOGY CLINIC | Age: 76
End: 2021-03-01
Payer: MEDICARE

## 2021-03-01 VITALS
WEIGHT: 177.8 LBS | DIASTOLIC BLOOD PRESSURE: 84 MMHG | HEART RATE: 79 BPM | RESPIRATION RATE: 16 BRPM | OXYGEN SATURATION: 95 % | SYSTOLIC BLOOD PRESSURE: 136 MMHG | BODY MASS INDEX: 29.62 KG/M2 | HEIGHT: 65 IN

## 2021-03-01 DIAGNOSIS — I34.0 NON-RHEUMATIC MITRAL REGURGITATION: ICD-10-CM

## 2021-03-01 DIAGNOSIS — I48.0 PAROXYSMAL ATRIAL FIBRILLATION (HCC): Primary | ICD-10-CM

## 2021-03-01 DIAGNOSIS — I10 ESSENTIAL HYPERTENSION: ICD-10-CM

## 2021-03-01 DIAGNOSIS — Z94.0 S/P KIDNEY TRANSPLANT: ICD-10-CM

## 2021-03-01 PROCEDURE — G8510 SCR DEP NEG, NO PLAN REQD: HCPCS | Performed by: INTERNAL MEDICINE

## 2021-03-01 PROCEDURE — 93005 ELECTROCARDIOGRAM TRACING: CPT | Performed by: INTERNAL MEDICINE

## 2021-03-01 PROCEDURE — G9231 DOC ESRD DIA TRANS PREG: HCPCS | Performed by: INTERNAL MEDICINE

## 2021-03-01 PROCEDURE — 99214 OFFICE O/P EST MOD 30 MIN: CPT | Performed by: INTERNAL MEDICINE

## 2021-03-01 PROCEDURE — 3017F COLORECTAL CA SCREEN DOC REV: CPT | Performed by: INTERNAL MEDICINE

## 2021-03-01 PROCEDURE — G0463 HOSPITAL OUTPT CLINIC VISIT: HCPCS | Performed by: INTERNAL MEDICINE

## 2021-03-01 PROCEDURE — G8400 PT W/DXA NO RESULTS DOC: HCPCS | Performed by: INTERNAL MEDICINE

## 2021-03-01 PROCEDURE — 1090F PRES/ABSN URINE INCON ASSESS: CPT | Performed by: INTERNAL MEDICINE

## 2021-03-01 PROCEDURE — G8427 DOCREV CUR MEDS BY ELIG CLIN: HCPCS | Performed by: INTERNAL MEDICINE

## 2021-03-01 PROCEDURE — G8536 NO DOC ELDER MAL SCRN: HCPCS | Performed by: INTERNAL MEDICINE

## 2021-03-01 PROCEDURE — G9899 SCRN MAM PERF RSLTS DOC: HCPCS | Performed by: INTERNAL MEDICINE

## 2021-03-01 PROCEDURE — 93010 ELECTROCARDIOGRAM REPORT: CPT | Performed by: INTERNAL MEDICINE

## 2021-03-01 PROCEDURE — 1101F PT FALLS ASSESS-DOCD LE1/YR: CPT | Performed by: INTERNAL MEDICINE

## 2021-03-01 PROCEDURE — G8419 CALC BMI OUT NRM PARAM NOF/U: HCPCS | Performed by: INTERNAL MEDICINE

## 2021-03-01 NOTE — PROGRESS NOTES
[address] [place] [address]  [phone]     Subjective:      Alexei Roy is a 70 y.o. female is here for routine f/u. She has a PMHx of PAF, mitral regurgitation, HTN, HLD and polycystic kidney disease s/p kidney transplant. Last seen by us in 5/19. She has done well since last OV. No recurrent palpitation. She is her 's primary caregiver. She reports one episode of mild dizziness back in January when she stood up quickly, no further episode. She states that she has received both Pfizer covid vaccines and did well. The patient denies chest pain/ shortness of breath, orthopnea, PND, LE edema, palpitations, syncope, or presyncope. Echocardiogram 5/19  · Left Ventricle: Mildly increased wall thickness. Estimated left ventricular ejection fraction is 61 - 65%. No regional wall motion abnormality noted. · Tricuspid Valve: Mild tricuspid valve regurgitation is present. · Pulmonary Artery: Mild pulmonary hypertension.          Patient Active Problem List    Diagnosis Date Noted    Osteopenia     Polycystic kidney disease 10/27/2012    Atrial fibrillation ([address] 10/26/2012    Hyponatremia 10/26/2012    Hypokalemia 10/26/2012    CKD (chronic kidney disease) 10/26/2012    HTN (hypertension) 10/26/2012      Mely Brennan MD  Past Medical History:   Diagnosis Date    Arthritis     Atrial fibrillation ([address]     Chronic kidney disease     on transplant list    Gastrointestinal disorder     GERD    GERD (gastroesophageal reflux disease)     Hypertension     Osteopenia     Polycystic kidney disease     Vitamin D deficiency       Past Surgical History:   Procedure Laterality Date    HX GYN  1990    hysterectomy/[place]/Dr Cedeno Console    HX HYSTERECTOMY      HX RENAL TRANSPLANT  01/06/2016    [place] ABDOMEN SURGERY PROC UNLISTED  9/15/15    Laparoscopic insertion of peritoneal dialysis catheter     Allergies   Allergen Reactions    Ace Inhibitors Cough    Dihydro-Cp [Dihydrocodeine-Cpm-Pseudoephed] Unknown (comments)      Family History   Problem Relation Age of Onset    Hypertension Mother     Kidney Disease Mother     Cancer Mother         Lung & throat    Dementia Father     Diabetes Father     Cancer Sister         spinal    Kidney Disease Sister     Kidney Disease Sister       Social History     Socioeconomic History    Marital status:      Spouse name: Not on file    Number of children: Not on file    Years of education: Not on file    Highest education level: Not on file   Occupational History    Not on file   Social Needs    Financial resource strain: Not on file    Food insecurity     Worry: Not on file     Inability: Not on file    Transportation needs     Medical: Not on file     Non-medical: Not on file   Tobacco Use    Smoking status: Former Smoker     Packs/day: 0.25     Years: 12.00     Pack years: 3.00     Types: Cigarettes     Quit date: 1985     Years since quittin.5    Smokeless tobacco: Never Used   Substance and Sexual Activity    Alcohol use: No    Drug use: No    Sexual activity: Yes     Partners: Male   Lifestyle    Physical activity     Days per week: Not on file     Minutes per session: Not on file    Stress: Not on file   Relationships    Social connections     Talks on phone: Not on file     Gets together: Not on file     Attends Sabianism service: Not on file     Active member of club or organization: Not on file     Attends meetings of clubs or organizations: Not on file     Relationship status: Not on file    Intimate partner violence     Fear of current or ex partner: Not on file     Emotionally abused: Not on file     Physically abused: Not on file     Forced sexual activity: Not on file   Other Topics Concern    Not on file   Social History Narrative    Not on file      Current Outpatient Medications   Medication Sig    ergocalciferol (ERGOCALCIFEROL) 1,250 mcg (50,000 unit) capsule Take 1 Cap by mouth every seven (7) days for 360 days.  aspirin (ASPIRIN) 325 mg tablet Take 325 mg by mouth daily.  labetalol (NORMODYNE) 200 mg tablet Take 200 mg by mouth three (3) times daily.  cloNIDine (CATAPRES) 0.2 mg/24 hr patch 1 Patch by TransDERmal route Every Thursday.  omeprazole (PRILOSEC OTC) 20 mg tablet Take 20 mg by mouth daily.  tacrolimus (PROGRAF) 1 mg capsule Take  by mouth every twelve (12) hours. Indications: 2 caps in am and 3 caps in pm    predniSONE (DELTASONE) 5 mg tablet Take 5 mg by mouth daily.  mycophenolate (CELLCEPT) 250 mg capsule Take 250 mg by mouth two (2) times a day.  potassium chloride (K-DUR, KLOR-CON) 20 mEq tablet Take  by mouth two (2) times a day.  diclofenac (VOLTAREN) 1 % gel diclofenac 1 % topical gel     No current facility-administered medications for this visit. Review of Symptoms:  11 systems reviewed, negative other than as stated in the HPI    Physical ExamPhysical Exam:    Vitals:    03/01/21 0902 03/01/21 0912   BP: (!) 172/98 (!) 140/92   Pulse: 79    Resp: 16    SpO2: 95%    Weight: 177 lb 12.8 oz (80.6 kg)    Height: 5' 5\" (1.651 m)      Body mass index is 29.59 kg/m². General PE  Gen:  NAD  Mental Status - Alert. General Appearance - Not in acute distress. HEENT:  PERRL, no carotid bruits or JVD  Chest and Lung Exam   Inspection: Accessory muscles - No use of accessory muscles in breathing. Auscultation:   Breath sounds: - Normal.   Cardiovascular   Inspection: Jugular vein - Bilateral - Inspection Normal.   Palpation/Percussion:   Apical Impulse: - Normal.   Auscultation: Rhythm - IRRegular. Heart Sounds - S1 WNL and S2 WNL. No S3 or S4. Murmurs & Other Heart Sounds: Auscultation of the heart reveals - No Murmurs. Peripheral Vascular   Upper Extremity: Inspection - Bilateral - No Cyanotic nailbeds or Digital clubbing. Lower Extremity:   Palpation: Edema - Bilateral - No edema.   Abdomen:   Soft, non-tender, bowel sounds are active. Neuro: A&O times 3, CN and motor grossly WNL    Labs:   No results found for: CHOL, CHOLX, CHLST, CHOLV, 951633, HDL, HDLP, LDL, LDLC, DLDLP, TGLX, TRIGL, TRIGP, CHHD, CHHDX  Lab Results   Component Value Date/Time     (H) 10/25/2012 11:52 PM     Lab Results   Component Value Date/Time    Sodium 141 01/18/2021 09:58 AM    Potassium 4.4 01/18/2021 09:58 AM    Chloride 105 01/18/2021 09:58 AM    CO2 26 01/18/2021 09:58 AM    Anion gap 9 05/14/2013 11:38 AM    Glucose 118 (H) 01/18/2021 09:58 AM    BUN 13 01/18/2021 09:58 AM    Creatinine 1.33 (H) 01/18/2021 09:58 AM    BUN/Creatinine ratio 10 (L) 01/18/2021 09:58 AM    GFR est AA 46 (L) 01/18/2021 09:58 AM    GFR est non-AA 40 (L) 01/18/2021 09:58 AM    Calcium 9.4 01/18/2021 09:58 AM    Bilirubin, total 0.7 01/18/2021 09:58 AM    Alk. phosphatase 113 01/18/2021 09:58 AM    Protein, total 6.4 01/18/2021 09:58 AM    Albumin 4.3 01/18/2021 09:58 AM    Globulin 3.8 05/14/2013 11:38 AM    A-G Ratio 2.0 01/18/2021 09:58 AM    ALT (SGPT) 14 01/18/2021 09:58 AM       EKG:         Assessment:     Assessment:      1. Paroxysmal atrial fibrillation (HCC)    2. Essential hypertension    3. Non-rheumatic mitral regurgitation    4. S/P kidney transplant        Orders Placed This Encounter    AMB POC EKG ROUTINE W/ 12 LEADS, INTER & REP     Order Specific Question:   Reason for Exam:     Answer:   afib        Plan:     1. Paroxysmal atrial fibrillation  in setting of hypokalemia and hyponatremia. No recurrence of A fib; continue labetolol and ASA therapy.     2. Non-rheumatic mitral regurgitation  Normal EF no significant valve issues per echo 5/19  Echo in 10/2012 with preserved LVEF 60% with mild-mod MR and mildly dilated RV     3. Essential hypertension  Controlled with current therapy: On Labatelol 200 mg TID and Clonidine 0.2 mg/ 24 hr patch.      4. Polycystic Kidney Disease s/p renal transplant in 1/2016- last kidney fxn Cr 1.33 in 1/2021. She is followed by Dr Chad Peralta. Continue current care and f/u in     Pansy Ours, NP       Patient seen and examined by me with nurse practitioner. I personally performed all components of the history, physical, and medical decision making and agree with the assessment and plan as noted.     Marielle Silver MD

## 2021-03-01 NOTE — PROGRESS NOTES
Chief Complaint   Patient presents with    Irregular Heart Beat     overdue, Pt c/o occasional dizziness. 1. Have you been to the ER, urgent care clinic since your last visit? Hospitalized since your last visit? No    2. Have you seen or consulted any other health care providers outside of the [address] [place] [address] since your last visit? Include any pap smears or colon screening. Dr. Cari Nagel deficiency. B/p elevated, doctor notified.

## 2021-06-04 DIAGNOSIS — E66.3 OVERWEIGHT: ICD-10-CM

## 2021-06-04 DIAGNOSIS — M89.9 DISORDER OF BONE, UNSPECIFIED: ICD-10-CM

## 2021-06-04 DIAGNOSIS — M85.80 OSTEOPENIA, UNSPECIFIED LOCATION: ICD-10-CM

## 2021-06-04 DIAGNOSIS — M85.80 OSTEOPENIA, UNSPECIFIED LOCATION: Primary | ICD-10-CM

## 2021-06-18 LAB
25(OH)D3+25(OH)D2 SERPL-MCNC: 29.4 NG/ML (ref 30–100)
ALBUMIN SERPL-MCNC: 4.4 G/DL (ref 3.7–4.7)
ALBUMIN/GLOB SERPL: 1.8 {RATIO} (ref 1.2–2.2)
ALP SERPL-CCNC: 89 IU/L (ref 48–121)
ALT SERPL-CCNC: 17 IU/L (ref 0–32)
AST SERPL-CCNC: 19 IU/L (ref 0–40)
BILIRUB SERPL-MCNC: 0.5 MG/DL (ref 0–1.2)
BUN SERPL-MCNC: 19 MG/DL (ref 8–27)
BUN/CREAT SERPL: 16 (ref 12–28)
CALCIUM SERPL-MCNC: 9.5 MG/DL (ref 8.7–10.3)
CHLORIDE SERPL-SCNC: 105 MMOL/L (ref 96–106)
CHOLEST SERPL-MCNC: 206 MG/DL (ref 100–199)
CO2 SERPL-SCNC: 23 MMOL/L (ref 20–29)
CREAT SERPL-MCNC: 1.19 MG/DL (ref 0.57–1)
GLOBULIN SER CALC-MCNC: 2.4 G/DL (ref 1.5–4.5)
GLUCOSE SERPL-MCNC: 98 MG/DL (ref 65–99)
HDLC SERPL-MCNC: 66 MG/DL
LDLC SERPL CALC-MCNC: 118 MG/DL (ref 0–99)
POTASSIUM SERPL-SCNC: 4.2 MMOL/L (ref 3.5–5.2)
PROT SERPL-MCNC: 6.8 G/DL (ref 6–8.5)
PTH-INTACT SERPL-MCNC: 84 PG/ML (ref 15–65)
SODIUM SERPL-SCNC: 143 MMOL/L (ref 134–144)
TRIGL SERPL-MCNC: 126 MG/DL (ref 0–149)
VLDLC SERPL CALC-MCNC: 22 MG/DL (ref 5–40)

## 2021-06-21 RX ORDER — ZOLEDRONIC ACID 5 MG/100ML
5 INJECTION, SOLUTION INTRAVENOUS ONCE
Status: ACTIVE | OUTPATIENT
Start: 2021-06-25 | End: 2021-06-25

## 2021-06-21 NOTE — LETTER
6/21/2021 Ms. Georgina Infante May 
[address] [place] [id number] Dear Acacia Spangler: 
 
Please find your most recent results below. Resulted Orders LIPID PANEL Result Value Ref Range Cholesterol, total 206 (H) 100 - 199 mg/dL Triglyceride 126 0 - 149 mg/dL HDL Cholesterol 66 >39 mg/dL VLDL, calculated 22 5 - 40 mg/dL LDL, calculated 118 (H) 0 - 99 mg/dL Narrative Performed at:  [address]  [id number] : Jessica Stevens MD, Phone:  [phone] METABOLIC PANEL, COMPREHENSIVE Result Value Ref Range Glucose 98 65 - 99 mg/dL BUN 19 8 - 27 mg/dL Creatinine 1.19 (H) 0.57 - 1.00 mg/dL GFR est non-AA 45 (L) >59 mL/min/1.73 GFR est AA 52 (L) >59 mL/min/1.73  
 BUN/Creatinine ratio 16 12 - 28 Sodium 143 134 - 144 mmol/L Potassium 4.2 3.5 - 5.2 mmol/L Chloride 105 96 - 106 mmol/L  
 CO2 23 20 - 29 mmol/L Calcium 9.5 8.7 - 10.3 mg/dL Protein, total 6.8 6.0 - 8.5 g/dL Albumin 4.4 3.7 - 4.7 g/dL GLOBULIN, TOTAL 2.4 1.5 - 4.5 g/dL A-G Ratio 1.8 1.2 - 2.2 Bilirubin, total 0.5 0.0 - 1.2 mg/dL Alk. phosphatase 89 48 - 121 IU/L  
 AST (SGOT) 19 0 - 40 IU/L  
 ALT (SGPT) 17 0 - 32 IU/L Narrative Performed at:  [address]  [id number] : Jessica Stevens MD, Phone:  [phone] PTH INTACT Result Value Ref Range PTH, Intact 84 (H) 15 - 65 pg/mL Narrative Performed at:  [address]  [id number] : Jessica Stevens MD, Phone:  [phone] VITAMIN D, 25 HYDROXY Result Value Ref Range VITAMIN D, 25-HYDROXY 29.4 (L) 30.0 - 100.0 ng/mL Narrative Performed at:  [address]  [id number] : Jessica Stevens MD, Phone:  [phone] RECOMMENDATIONS: 
 
Suleman Elkins, As you can see, your vitamin D level has improved. With this, your parathyroid hormone level has improved: 
 
Results for Enrique Celis (MRN [id number]) as of 6/21/2021 10:00 Ref. Range 1/18/2021 09:58 VITAMIN D, 25-HYDROXY Latest Ref Range: 30.0 - 100.0 ng/mL 16.1 (L) Results for Enrique Celis (MRN [id number]) as of 6/21/2021 10:00 Ref. Range 6/17/2021 10:10  
VITAMIN D, 25-HYDROXY Latest Ref Range: 30.0 - 100.0 ng/mL 29.4 (L) Your parathyroid hormone levels remains slightly elevated but your calcium level is NORMAL at this time (see above, corrected for albumin your calcium is 9.2 mg/dL). Overall, the primary reason why we care about your parathyroid hormone level is due to your osteopenia. As you will recall, your 10 year risk of hip fracture is above our treatment cutoff of 3% (yours is 11%): The use of prednisone increases this risk significantly. As such, I recommend we use a once every 18 month infusion of a medication called reclast. It is the same medication class as the oral bisphosphate you took years ago (boniva and the like). I would like you to discuss this with your dentist if you have not already (you may have already done so in February) as reclast can cause delayed healing if you need to have a tooth pulled out or implanted. We will administer 1 dose at the infusion center either at [address] [place] [address] or [facility] and then repeat a bone density test in 11/2022 to assess for improvements in bone density. It is likely that we will administer a second dose 18 months after the first. We may continue this once every 18 month dosing schedule for a total of 3 doses over 5 years. The medication is generally well tolerated but may be associated with bone aches/ a flu like feeling for a few days. I will plan to see you again in June 2022. Finally, please begin taking 1000 international units of vitamin D over the counter daily. Please call me if you have any questions: [phone] Sincerely, 
 
 Ousmane Borden MD

## 2021-06-22 ENCOUNTER — TELEPHONE (OUTPATIENT)
Dept: ENDOCRINOLOGY | Age: 76
End: 2021-06-22

## 2021-06-22 NOTE — TELEPHONE ENCOUNTER
Spoke with pharmacist re reclast vs boniva- up to 8-17 up to 40%   Creatinine clearance fluctuates more - prograf can reduce filtration  [facility] Dr. RAVI FRIAS

## 2021-06-23 RX ORDER — IBANDRONATE SODIUM 150 MG/1
150 TABLET, FILM COATED ORAL
Qty: 12 TABLET | Refills: 0 | Status: SHIPPED | OUTPATIENT
Start: 2021-06-23 | End: 2022-06-21 | Stop reason: SDUPTHER

## 2021-06-25 ENCOUNTER — HOSPITAL ENCOUNTER (OUTPATIENT)
Dept: INFUSION THERAPY | Age: 76
Discharge: HOME | End: 2021-06-25

## 2021-11-30 ENCOUNTER — TRANSCRIBE ORDER (OUTPATIENT)
Dept: SCHEDULING | Age: 76
End: 2021-11-30

## 2021-11-30 DIAGNOSIS — Z12.31 SCREENING MAMMOGRAM FOR HIGH-RISK PATIENT: Primary | ICD-10-CM

## 2022-01-13 ENCOUNTER — HOSPITAL ENCOUNTER (OUTPATIENT)
Dept: MAMMOGRAPHY | Age: 77
Discharge: HOME | End: 2022-01-13
Attending: INTERNAL MEDICINE
Payer: MEDICARE

## 2022-01-13 DIAGNOSIS — Z12.31 SCREENING MAMMOGRAM FOR HIGH-RISK PATIENT: ICD-10-CM

## 2022-01-13 PROCEDURE — 77067 SCR MAMMO BI INCL CAD: CPT

## 2022-03-07 ENCOUNTER — OFFICE VISIT (OUTPATIENT)
Dept: CARDIOLOGY CLINIC | Age: 77
End: 2022-03-07
Payer: MEDICARE

## 2022-03-07 VITALS
HEIGHT: 65 IN | OXYGEN SATURATION: 99 % | DIASTOLIC BLOOD PRESSURE: 86 MMHG | WEIGHT: 177.9 LBS | HEART RATE: 59 BPM | BODY MASS INDEX: 29.64 KG/M2 | SYSTOLIC BLOOD PRESSURE: 136 MMHG | RESPIRATION RATE: 16 BRPM

## 2022-03-07 DIAGNOSIS — I10 PRIMARY HYPERTENSION: ICD-10-CM

## 2022-03-07 DIAGNOSIS — I48.0 PAROXYSMAL ATRIAL FIBRILLATION (HCC): Primary | ICD-10-CM

## 2022-03-07 DIAGNOSIS — Q61.3 POLYCYSTIC KIDNEY DISEASE: Chronic | ICD-10-CM

## 2022-03-07 PROCEDURE — G0463 HOSPITAL OUTPT CLINIC VISIT: HCPCS | Performed by: INTERNAL MEDICINE

## 2022-03-07 PROCEDURE — G8419 CALC BMI OUT NRM PARAM NOF/U: HCPCS | Performed by: INTERNAL MEDICINE

## 2022-03-07 PROCEDURE — 3017F COLORECTAL CA SCREEN DOC REV: CPT | Performed by: INTERNAL MEDICINE

## 2022-03-07 PROCEDURE — 1101F PT FALLS ASSESS-DOCD LE1/YR: CPT | Performed by: INTERNAL MEDICINE

## 2022-03-07 PROCEDURE — 99214 OFFICE O/P EST MOD 30 MIN: CPT | Performed by: INTERNAL MEDICINE

## 2022-03-07 PROCEDURE — G8400 PT W/DXA NO RESULTS DOC: HCPCS | Performed by: INTERNAL MEDICINE

## 2022-03-07 PROCEDURE — 93005 ELECTROCARDIOGRAM TRACING: CPT | Performed by: INTERNAL MEDICINE

## 2022-03-07 PROCEDURE — G8536 NO DOC ELDER MAL SCRN: HCPCS | Performed by: INTERNAL MEDICINE

## 2022-03-07 PROCEDURE — 93010 ELECTROCARDIOGRAM REPORT: CPT | Performed by: INTERNAL MEDICINE

## 2022-03-07 PROCEDURE — 1090F PRES/ABSN URINE INCON ASSESS: CPT | Performed by: INTERNAL MEDICINE

## 2022-03-07 PROCEDURE — G8510 SCR DEP NEG, NO PLAN REQD: HCPCS | Performed by: INTERNAL MEDICINE

## 2022-03-07 PROCEDURE — G8427 DOCREV CUR MEDS BY ELIG CLIN: HCPCS | Performed by: INTERNAL MEDICINE

## 2022-03-07 PROCEDURE — G9231 DOC ESRD DIA TRANS PREG: HCPCS | Performed by: INTERNAL MEDICINE

## 2022-03-07 PROCEDURE — G9899 SCRN MAM PERF RSLTS DOC: HCPCS | Performed by: INTERNAL MEDICINE

## 2022-03-07 RX ORDER — GLUCOSAMINE SULFATE 1500 MG
1000 POWDER IN PACKET (EA) ORAL DAILY
COMMUNITY

## 2022-03-07 NOTE — PROGRESS NOTES
3/7/2022 10:37 AM      Subjective:     Bart Roy   denies chest pain, chest pressure/discomfort, dyspnea, palpitations, irregular heart beats, near-syncope, syncope, fatigue, orthopnea, paroxysmal nocturnal dyspnea, exertional chest pressure/discomfort, claudication, lower extremity edema, tachypnea. Visit Vitals  /86   Pulse (!) 59   Resp 16   Ht 5' 5\" (1.651 m)   Wt 177 lb 14.4 oz (80.7 kg)   SpO2 99%   BMI 29.60 kg/m²     Current Outpatient Medications   Medication Sig    cholecalciferol (VITAMIN D3) 25 mcg (1,000 unit) cap Take 1,000 Units by mouth daily.  ibandronate (Boniva) 150 mg tablet Take 150 mg by mouth every thirty (30) days.  aspirin (ASPIRIN) 325 mg tablet Take 325 mg by mouth daily.  labetalol (NORMODYNE) 200 mg tablet Take 200 mg by mouth three (3) times daily.  cloNIDine (CATAPRES) 0.2 mg/24 hr patch 1 Patch by TransDERmal route Every Thursday.  omeprazole (PRILOSEC OTC) 20 mg tablet Take 20 mg by mouth daily.  tacrolimus (PROGRAF) 1 mg capsule Take  by mouth. 2 tabs twice daily    predniSONE (DELTASONE) 5 mg tablet Take 2.5 mg by mouth daily.  mycophenolate (CELLCEPT) 250 mg capsule Take 250 mg by mouth two (2) times a day.  potassium chloride (K-DUR, KLOR-CON) 20 mEq tablet Take  by mouth two (2) times a day. No current facility-administered medications for this visit.          Objective:      Visit Vitals  /86   Pulse (!) 59   Resp 16   Ht 5' 5\" (1.651 m)   Wt 177 lb 14.4 oz (80.7 kg)   SpO2 99%   BMI 29.60 kg/m²       Past Medical History:   Diagnosis Date    Arthritis     Atrial fibrillation (HCC)     Chronic kidney disease     on transplant list    Gastrointestinal disorder     GERD    GERD (gastroesophageal reflux disease)     Hypertension     Murmur     Osteopenia     Polycystic kidney disease     Vitamin D deficiency       Past Surgical History:   Procedure Laterality Date    HX GYN  1990 hysterectomy/[facility]/Dr Kelvin Arredondo    HX HYSTERECTOMY      HX RENAL TRANSPLANT  2016    [place] ABDOMEN SURGERY PROC UNLISTED  9/15/15    Laparoscopic insertion of peritoneal dialysis catheter     Allergies   Allergen Reactions    Ace Inhibitors Cough    Dihydro-Cp [Dihydrocodeine-Cpm-Pseudoephed] Unknown (comments)      Family History   Problem Relation Age of Onset    Hypertension Mother     Kidney Disease Mother     Cancer Mother         Lung & throat    Dementia Father     Diabetes Father     Cancer Sister         spinal    Kidney Disease Sister     Kidney Disease Sister       Social History     Socioeconomic History    Marital status:      Spouse name: Not on file    Number of children: Not on file    Years of education: Not on file    Highest education level: Not on file   Occupational History    Not on file   Tobacco Use    Smoking status: Former Smoker     Packs/day: 0.25     Years: 12.00     Pack years: 3.00     Types: Cigarettes     Quit date: 1985     Years since quittin.5    Smokeless tobacco: Never Used   Vaping Use    Vaping Use: Never used   Substance and Sexual Activity    Alcohol use: No    Drug use: No    Sexual activity: Yes     Partners: Male   Other Topics Concern    Not on file   Social History Narrative    Not on file     Social Determinants of Health     Financial Resource Strain:     Difficulty of Paying Living Expenses: Not on file   Food Insecurity:     Worried About [address] in the Last Year: Not on file    [place] [employer] in the Last Year: Not on file   Transportation Needs:     Lack of Transportation (Medical): Not on file    Lack of Transportation (Non-Medical):  Not on file   Physical Activity:     Days of Exercise per Week: Not on file    Minutes of Exercise per Session: Not on file   Stress:     Feeling of Stress : Not on file   Social Connections:     Frequency of Communication with Friends and Family: Not on file    Frequency of Social Gatherings with Friends and Family: Not on file    Attends Confucianism Services: Not on file    Active Member of Clubs or Organizations: Not on file    Attends Club or Organization Meetings: Not on file    Marital Status: Not on file   Intimate Partner Violence:     Fear of Current or Ex-Partner: Not on file    Emotionally Abused: Not on file    Physically Abused: Not on file    Sexually Abused: Not on file   Housing Stability:     Unable to Pay for Housing in the Last Year: Not on file    Number of [place] in the Last Year: Not on file    Unstable Housing in the Last Year: Not on file       Assessment:       ICD-10-CM ICD-9-CM    1. Paroxysmal atrial fibrillation (HCC)  I48.0 427.31 AMB POC EKG ROUTINE W/ 12 LEADS, INTER & REP   2. Primary hypertension  I10 401.9 AMB POC EKG ROUTINE W/ 12 LEADS, INTER & REP   3. Polycystic kidney disease  Q61.3 753.12        Plan:     Transient atrial fibrillation  in setting of hypokalemia and hyponatremia. No recurrence of A fib; continue labetolol and ASA therapy.     Essential hypertension  Controlled with current therapy     Polycystic Kidney Disease s/p renal transplant in 1/2016. F/w Dr Christel Benedict.

## 2022-03-07 NOTE — PROGRESS NOTES
1. Have you been to the ER, urgent care clinic since your last visit? Hospitalized since your last visit? No.    2. Have you seen or consulted any other health care providers outside of the [address] [place] [address] since your last visit? Include any pap smears or colon screening.    No.        Chief Complaint   Patient presents with    Irregular Heart Beat     Afib    Annual Exam     pt denies any cardiac symptoms

## 2022-06-21 ENCOUNTER — VIRTUAL VISIT (OUTPATIENT)
Dept: ENDOCRINOLOGY | Age: 77
End: 2022-06-21
Payer: MEDICARE

## 2022-06-21 DIAGNOSIS — Z94.0 HISTORY OF RENAL TRANSPLANT: Primary | ICD-10-CM

## 2022-06-21 DIAGNOSIS — R79.89 LOW VITAMIN D LEVEL: ICD-10-CM

## 2022-06-21 DIAGNOSIS — M85.80 OSTEOPENIA, UNSPECIFIED LOCATION: ICD-10-CM

## 2022-06-21 PROCEDURE — 1123F ACP DISCUSS/DSCN MKR DOCD: CPT | Performed by: INTERNAL MEDICINE

## 2022-06-21 PROCEDURE — G8432 DEP SCR NOT DOC, RNG: HCPCS | Performed by: INTERNAL MEDICINE

## 2022-06-21 PROCEDURE — G8536 NO DOC ELDER MAL SCRN: HCPCS | Performed by: INTERNAL MEDICINE

## 2022-06-21 PROCEDURE — 1101F PT FALLS ASSESS-DOCD LE1/YR: CPT | Performed by: INTERNAL MEDICINE

## 2022-06-21 PROCEDURE — G9231 DOC ESRD DIA TRANS PREG: HCPCS | Performed by: INTERNAL MEDICINE

## 2022-06-21 PROCEDURE — 1090F PRES/ABSN URINE INCON ASSESS: CPT | Performed by: INTERNAL MEDICINE

## 2022-06-21 PROCEDURE — G8400 PT W/DXA NO RESULTS DOC: HCPCS | Performed by: INTERNAL MEDICINE

## 2022-06-21 PROCEDURE — 3017F COLORECTAL CA SCREEN DOC REV: CPT | Performed by: INTERNAL MEDICINE

## 2022-06-21 PROCEDURE — G8427 DOCREV CUR MEDS BY ELIG CLIN: HCPCS | Performed by: INTERNAL MEDICINE

## 2022-06-21 PROCEDURE — 99214 OFFICE O/P EST MOD 30 MIN: CPT | Performed by: INTERNAL MEDICINE

## 2022-06-21 PROCEDURE — G8417 CALC BMI ABV UP PARAM F/U: HCPCS | Performed by: INTERNAL MEDICINE

## 2022-06-21 RX ORDER — IBANDRONATE SODIUM 150 MG/1
150 TABLET, FILM COATED ORAL
Qty: 12 TABLET | Refills: 0 | Status: SHIPPED | OUTPATIENT
Start: 2022-06-21

## 2022-06-21 NOTE — PROGRESS NOTES
Chief Complaint   Patient presents with    Osteopenia    Follow-up     History of Present Illness: Mookie Roy is a 78 y.o. female with a past medical history significant for autosomal dominant polycystic kidney disease status post kidney transplant 01/2016 presenting in follow up for discussion related to osteopenia.     01/13/2021:  Is taking prednisone 5mg daily, has been taking since transplant. Previously took fosamax, PCP recommended she stop it in 2011- at that time had been taking it for >5 years. Menarche occurred age 14, menopause in her 45s. Creatinine was 1.2 on most recent assessment, last labs were in November. Does not take vitamin D. No hx of fractures, had sprain last fall of R ankle. 01/27/2021:  Took first vitamin D pill last week. Was wondering why her PTH is high. States that her creatinine is typically 1.2. Took an oral bisphosphonate many years ago. Is concerned about her fasting blood glucose level on the recent labs. Does exercise around her house for 30 minutes. Is trying to watch sweets. 06/21/2022: JEANNIE Mills is transplant clinic provider, also seeing Anirudh Stauffer MD for Nephrology. Does not have acid reflux following the monthly Boniva dose. Continues taking vitamin D daily, 1000 international units. Attempt to obtain calcium via the diet. No falls or fractures since her last visit. Received 1 dose of Reclast in June 2021.  01/2022 Junie visit:      Current Outpatient Medications   Medication Sig    cholecalciferol (VITAMIN D3) 25 mcg (1,000 unit) cap Take 1,000 Units by mouth daily.  ibandronate (Boniva) 150 mg tablet Take 150 mg by mouth every thirty (30) days.  aspirin (ASPIRIN) 325 mg tablet Take 325 mg by mouth daily.  labetalol (NORMODYNE) 200 mg tablet Take 200 mg by mouth three (3) times daily.  cloNIDine (CATAPRES) 0.2 mg/24 hr patch 1 Patch by TransDERmal route Every Thursday.     omeprazole (PRILOSEC OTC) 20 mg tablet Take 20 mg by mouth daily.  tacrolimus (PROGRAF) 1 mg capsule Take  by mouth. 2 tabs twice daily    predniSONE (DELTASONE) 5 mg tablet Take 2.5 mg by mouth daily.  mycophenolate (CELLCEPT) 250 mg capsule Take 250 mg by mouth two (2) times a day.  potassium chloride (K-DUR, KLOR-CON) 20 mEq tablet Take  by mouth two (2) times a day. No current facility-administered medications for this visit. Social Hx: smoked when younger- stopped in 35s  Lives closer to [facility] [address]:  -     - Physical Examination:  - - GENERAL: Julien Kitchen well nourished   - EYES: EOMI, non-icteric, no proptosis   - Ear/Nose/Throat: NCAT, no visible inflammation or masses   - CARDIOVASCULAR: no cyanosis, no visible JVD   - RESPIRATORY: respiratory effort normal without any distress or labored breathing   - MUSCULOSKELETAL: Normal ROM of neck and upper extremities observed   - SKIN: No rash on face  - NEUROLOGIC:  No facial asymmetry (Cranial nerve 7 motor function), No gaze palsy   - PSYCHIATRIC: Normal affect, Normal insight and judgement     Data Reviewed:       11/2020 DEXA:      Results for Sheela Black (MRN [id number]) as of 1/27/2021 14:25   Ref.  Range 1/18/2021 09:58   Sodium Latest Ref Range: 134 - 144 mmol/L 141   Potassium Latest Ref Range: 3.5 - 5.2 mmol/L 4.4   Chloride Latest Ref Range: 96 - 106 mmol/L 105   CO2 Latest Ref Range: 20 - 29 mmol/L 26   Glucose Latest Ref Range: 65 - 99 mg/dL 118 (H)   BUN Latest Ref Range: 8 - 27 mg/dL 13   Creatinine Latest Ref Range: 0.57 - 1.00 mg/dL 1.33 (H)   BUN/Creatinine ratio Latest Ref Range: 12 - 28  10 (L)   Calcium Latest Ref Range: 8.7 - 10.3 mg/dL 9.4   GFR est non-AA Latest Ref Range: >59 mL/min/1.73 40 (L)   GFR est AA Latest Ref Range: >59 mL/min/1.73 46 (L)   Bilirubin, total Latest Ref Range: 0.0 - 1.2 mg/dL 0.7   Protein, total Latest Ref Range: 6.0 - 8.5 g/dL 6.4   Albumin Latest Ref Range: 3.7 - 4.7 g/dL 4.3   A-G Ratio Latest Ref Range: 1.2 - 2.2 2.0   A/G ratio Latest Ref Range: 0.7 - 1.7  1.5   ALT Latest Ref Range: 0 - 32 IU/L 14   AST Latest Ref Range: 0 - 40 IU/L 16   Alk. phosphatase Latest Ref Range: 39 - 117 IU/L 113   TSH Latest Ref Range: 0.450 - 4.500 uIU/mL 2.740         Assessment/Plan: This is a very pleasant 79 yo female with a past medical history significant for autosomal dominant polycystic kidney disease status post renal transplant in January 2016 presenting in follow-up for discussion related to medication management of this condition. Kelly Scott remains on prednisone. Her previous 10-year risk of hip fracture was 11.8%; we treat osteopenia when this risk exceeds 3%. We have supplemented vitamin D and Kelly Scott continues to take a maintenance dose. There is some degree of secondary hyperparathyroidism present leading to the elevated PTH. Creatinine clearance Via Cockcroft-Gault equation was 48 mL/min. (Cutoff for Reclast use is 35 mL/min) but the transplant clinic prefer that we use an oral bisphosphonate versus an IV. She did receive 1 dose of reclast in 06/2021. That is out of her system by now. We will continue the oral bisphosphonate until DEXA, renal function is stable. Previous history of bisphosphonate use does not increase risk of atypical femoral fracture as she has had a 5-year drug holiday. Will refill prescription for 1 year.     #Osteopenia with current prednisone use, 10-year risk of hip fracture 11%  -Treatment is warranted given 10-year risk of hip fracture 11%  -Supplement vitamin D with 50,000 international units once weekly for 8 weeks  -Reassess both vitamin D and PTH levels following this load  -Creatinine clearance is 48 mL/min  -Discussed rare but real risk of osteonecrosis of the jaw with bisphosphonate use, encouraged her to make an appointment with her dentist which she already has scheduled for February 11  -DEXA due 11/2022, will order at f/u visit in 03/2023    Calcium Content of Selected Foods    Dairy and Soy Amount Calcium (mg)   Milk (skim, low fat, whole) 1 cup 300   Buttermilk 1 cup 300   Cottage Cheese . 5 cup 65   Ice Cream or Ice Milk . 5 cup 100   Sour Cream, cultured 1 cup 250   Soy Milk, calcium fortified 1 cup 200 to 400   Yogurt 1 cup 450   Yogurt drink 12 oz 300   [place] Instant Breakfast 1 packet 250   Hot [place]oa, calcium fortified 1 packet 320   Nonfat dry milk powder 5 Tbsp 300   Brie Cheese 1 oz 50   Hard Cheese (cheddar, antonio) 1 oz 200   Mozzarella 1 oz 200   Parmesan Cheese 1 Tbsp 70   Swiss or Gruyere 1 oz 270     Vegetables   Booth squash, cooked 1 cup 90   Arugula, raw 1 cup 125   Bok Godfrey, raw 1 cup 40   Broccoli, cooked 1 cup 180   Chard or Okra, cooked 1 cup 100   Chicory (curly endive), raw 1 cup 40   Priscila greens 1 cup 50   Corn, brine packed 1 cup 10   Dandelion greens, raw 1 cup 80   Kale, raw 1 cup 55   Kelp or Kombe 1 cup 60   Mustard greens 1 cup 40   Spinach, cooked 1 cup 240   Turnip greens, raw 1 cup 80     Fruits   Figs, dried, uncooked 1 cup 300   Kiwi, raw 1 cup 50   Orange juice, calcium fortified 8 oz 300   Orange juice, from concentrate 1 cup 20     Legumes   Garbanzo Beans, cooked 1 cup 80   Legumes, general, cooked . 5 cup 15 to 50   [place] Beans, cooked 1 cup 75   Soybeans, boiled . 5 cup 100   Temphe . 5 cup 75   Tofu, firm, calcium set 4 oz 250 to 750   Tofu, soft regular 4 oz 120 to 390   White Beans, cooked . 5 cup 70     Grains   Cereals (calcium fortified) . 5 to 1 cup 250 to 1000   Amaranth, cooked . 5 cup 135   Bread, calcium fortified 1 slice 588 to 129   Brown rice, long grain, raw 1 cup 50   Oatmeal, instant 1 package 100 to 150   Tortillas, corn 2 85     Nuts and Seeds   Almonds, toasted unblanched 1 oz 80   Sesame seeds, whole roasted 1 oz 280   Sesame tahini 1 oz (2 Tbsp) 130   Sunflower seeds, dried 1 oz 50     Fish   Mackerel, canned 3 oz 250   [place], canned, with bones 3 oz 170 to 210   Sardines 3 oz 370     Other   Molasses, blackstrap 1 Tbsp 135   * When range is given, calcium content varies by product. * The calcium content of plant foods is varied. Most vegetables, legumes, nuts, seeds, and dried fruit contain some calcium. Listed are selected significant sources of well-absorbed calcium. References:   "[employer] Handbook 8 palm program    Mini and Yarsani     How Much Do You Need? Age Calcium (mg)   3- 1year old 500 mg   3- 6year old 800 mg   5- 25year old 1300 mg   23- 48year old 1000 mg   46- 75 year old 1200 mg   > 79year old 1200 mg     [email or web address]      Copy sent to:Remedios Meadows MD     Return to care in 1 year      Radha Roy is a 78 y.o. female being evaluated by a Virtual Visit (video visit) encounter to address concerns as mentioned above. A caregiver was present when appropriate. Due to this being a TeleHealth encounter (During EXEGG-29 public health emergency), evaluation of the following organ systems was limited:     Vitals/Constitutional/EENT/Resp/CV/GI//MS/Neuro/Skin/Heme-Lymph-Imm. Pursuant to the emergency declaration under the [address] [place] [address], [address] [place] [address] authority and the [employer] and [email or web address] General Act, this Virtual Visit was conducted with patient's (and/or legal guardian's) consent, to reduce the risk of exposure to COVID-19 and provide necessary medical care. Services were provided through a video synchronous discussion virtually to substitute for in-person encounter. --Christine Her MD on 6/21/2022 at 1:42 PM    An electronic signature was used to authenticate this note.

## 2022-06-23 ENCOUNTER — TELEPHONE (OUTPATIENT)
Dept: ENDOCRINOLOGY | Age: 77
End: 2022-06-23

## 2022-06-23 NOTE — TELEPHONE ENCOUNTER
6/23/2022  8:56 AM      Claudia from [facility] called on behalf of pt. Claudia works with [employer]. Claudia would like us to fax her the pt's notes from her last appointment which was on June 21,2022. Claudia#[phone]  [facility]#[phone]      Thanks,  Minh Marquez

## 2022-10-03 ENCOUNTER — TELEPHONE (OUTPATIENT)
Dept: ENDOCRINOLOGY | Age: 77
End: 2022-10-03

## 2022-10-03 NOTE — TELEPHONE ENCOUNTER
10/3/2022  1:29 PM    Ko Greg from dr Ivone Dias office called and left message to have latest office notes and labs faxed to their office at [phone]

## 2022-12-15 ENCOUNTER — TRANSCRIBE ORDER (OUTPATIENT)
Dept: SCHEDULING | Age: 77
End: 2022-12-15

## 2022-12-15 DIAGNOSIS — Z12.31 SCREENING MAMMOGRAM FOR HIGH-RISK PATIENT: Primary | ICD-10-CM

## 2023-01-31 ENCOUNTER — HOSPITAL ENCOUNTER (OUTPATIENT)
Dept: MAMMOGRAPHY | Age: 78
Discharge: HOME | End: 2023-01-31
Attending: INTERNAL MEDICINE
Payer: MEDICARE

## 2023-01-31 DIAGNOSIS — Z12.31 SCREENING MAMMOGRAM FOR HIGH-RISK PATIENT: ICD-10-CM

## 2023-01-31 PROCEDURE — 77067 SCR MAMMO BI INCL CAD: CPT

## 2023-07-17 RX ORDER — IBANDRONATE SODIUM 150 MG/1
TABLET, FILM COATED ORAL
Qty: 3 TABLET | Refills: 1 | Status: SHIPPED | OUTPATIENT
Start: 2023-07-17

## 2023-10-17 NOTE — PROGRESS NOTES
Addended by: JOSE DALE on: 10/17/2023 04:20 PM     Modules accepted: Orders     See my chart message.     [facility], [place] [address] [facility]

## 2024-01-04 ENCOUNTER — TRANSCRIBE ORDERS (OUTPATIENT)
Facility: HOSPITAL | Age: 79
End: 2024-01-04

## 2024-01-04 DIAGNOSIS — Z12.31 VISIT FOR SCREENING MAMMOGRAM: Primary | ICD-10-CM

## 2024-01-17 RX ORDER — IBANDRONATE SODIUM 150 MG/1
TABLET, FILM COATED ORAL
Qty: 3 TABLET | Refills: 1 | OUTPATIENT
Start: 2024-01-17

## 2024-02-15 ENCOUNTER — HOSPITAL ENCOUNTER (OUTPATIENT)
Facility: HOSPITAL | Age: 79
Discharge: HOME | End: 2024-02-15
Payer: MEDICARE

## 2024-02-15 VITALS — BODY MASS INDEX: 29.49 KG/M2 | HEIGHT: 65 IN | WEIGHT: 177 LBS

## 2024-02-15 DIAGNOSIS — Z12.31 VISIT FOR SCREENING MAMMOGRAM: ICD-10-CM

## 2024-02-15 PROCEDURE — 77067 SCR MAMMO BI INCL CAD: CPT

## 2024-02-22 ENCOUNTER — HOSPITAL ENCOUNTER (OUTPATIENT)
Facility: HOSPITAL | Age: 79
End: 2024-02-22
Payer: MEDICARE

## 2024-02-22 ENCOUNTER — HOSPITAL ENCOUNTER (OUTPATIENT)
Facility: HOSPITAL | Age: 79
Discharge: HOME | End: 2024-02-22
Payer: MEDICARE

## 2024-02-22 DIAGNOSIS — R92.8 ABNORMAL MAMMOGRAM OF RIGHT BREAST: ICD-10-CM

## 2024-02-22 PROCEDURE — 76642 ULTRASOUND BREAST LIMITED: CPT

## 2024-02-22 PROCEDURE — G0279 TOMOSYNTHESIS, MAMMO: HCPCS

## 2024-02-27 ENCOUNTER — OFFICE VISIT (OUTPATIENT)
Age: 79
End: 2024-02-27
Payer: MEDICARE

## 2024-02-27 VITALS — HEIGHT: 65 IN | BODY MASS INDEX: 28.86 KG/M2 | WEIGHT: 173.2 LBS

## 2024-02-27 DIAGNOSIS — M81.0 AGE-RELATED OSTEOPOROSIS WITHOUT CURRENT PATHOLOGICAL FRACTURE: ICD-10-CM

## 2024-02-27 DIAGNOSIS — M85.80 OTHER SPECIFIED DISORDERS OF BONE DENSITY AND STRUCTURE, UNSPECIFIED SITE: Primary | ICD-10-CM

## 2024-02-27 PROCEDURE — G8428 CUR MEDS NOT DOCUMENT: HCPCS | Performed by: INTERNAL MEDICINE

## 2024-02-27 PROCEDURE — G8484 FLU IMMUNIZE NO ADMIN: HCPCS | Performed by: INTERNAL MEDICINE

## 2024-02-27 PROCEDURE — G8400 PT W/DXA NO RESULTS DOC: HCPCS | Performed by: INTERNAL MEDICINE

## 2024-02-27 PROCEDURE — 99214 OFFICE O/P EST MOD 30 MIN: CPT | Performed by: INTERNAL MEDICINE

## 2024-02-27 PROCEDURE — 1090F PRES/ABSN URINE INCON ASSESS: CPT | Performed by: INTERNAL MEDICINE

## 2024-02-27 PROCEDURE — G2211 COMPLEX E/M VISIT ADD ON: HCPCS | Performed by: INTERNAL MEDICINE

## 2024-02-27 PROCEDURE — G8419 CALC BMI OUT NRM PARAM NOF/U: HCPCS | Performed by: INTERNAL MEDICINE

## 2024-02-27 PROCEDURE — 1123F ACP DISCUSS/DSCN MKR DOCD: CPT | Performed by: INTERNAL MEDICINE

## 2024-02-27 PROCEDURE — 4004F PT TOBACCO SCREEN RCVD TLK: CPT | Performed by: INTERNAL MEDICINE

## 2024-02-27 RX ORDER — AMLODIPINE BESYLATE 5 MG/1
1 TABLET ORAL DAILY
COMMUNITY

## 2024-02-27 RX ORDER — ATORVASTATIN CALCIUM 40 MG/1
1 TABLET, FILM COATED ORAL DAILY
COMMUNITY

## 2024-02-27 RX ORDER — CARVEDILOL 6.25 MG/1
1 TABLET ORAL 2 TIMES DAILY
COMMUNITY
Start: 2023-10-24

## 2024-02-27 RX ORDER — APIXABAN 5 MG/1
1 TABLET, FILM COATED ORAL 2 TIMES DAILY
COMMUNITY
Start: 2023-11-08

## 2024-02-27 NOTE — PROGRESS NOTES
Chief Complaint   Patient presents with    Osteoporosis        History of Present Illness: Anitha Ta is a 78 y.o. female with a past medical history significant for autosomal dominant polycystic  kidney disease status post kidney transplant 01/2016 presenting in follow up for discussion related to osteopenia.       01/13/2021:   Is taking prednisone 5mg daily, has been taking since transplant. Previously took fosamax, PCP recommended she stop it in 2011- at that time had been taking it for >5 years. Menarche occurred age 18, menopause in her 40s. Creatinine was 1.2 on most recent  assessment, last labs were in November. Does not take vitamin D. No hx of fractures, had sprain last fall of R ankle.      01/27/2021:   Took first vitamin D pill last week.  Was wondering why her PTH is high.  States that her creatinine is typically 1.2.  Took an oral bisphosphonate many years ago.  Is concerned about her fasting blood glucose level on the recent labs.  Does exercise  around her house for 30 minutes.  Is trying to watch sweets.      06/21/2022: TANIYA Wyman is transplant clinic provider, also seeing Nathalia Murdock MD for Nephrology.  Does not have acid reflux following the monthly Boniva dose.  Continues taking vitamin D daily, 1000 international units.  Attempt to obtain  calcium via the diet.  No falls or fractures since her last visit.  Received 1 dose of Reclast in June 2021.   01/2022 Damaso visit:          02/26/2024: Since last visit, ministroke now on elequis. DBP today 90s- not usually that high. Taking 1000IU daily of vitamin D.          Current Outpatient Medications:     ELIQUIS 5 MG TABS tablet, Take 1 tablet by mouth 2 times daily, Disp: , Rfl:     amLODIPine (NORVASC) 5 MG tablet, Take 1 tablet by mouth daily, Disp: , Rfl:     atorvastatin (LIPITOR) 40 MG tablet, Take 1 tablet by mouth daily, Disp: , Rfl:     carvedilol (COREG) 6.25 MG tablet, Take 1 tablet by mouth 2 times daily, Disp: ,

## 2024-03-11 ENCOUNTER — HOSPITAL ENCOUNTER (OUTPATIENT)
Facility: HOSPITAL | Age: 79
Discharge: HOME | End: 2024-03-13
Attending: INTERNAL MEDICINE
Payer: MEDICARE

## 2024-03-11 ENCOUNTER — ANESTHESIA EVENT (OUTPATIENT)
Facility: HOSPITAL | Age: 79
End: 2024-03-11
Payer: MEDICARE

## 2024-03-11 ENCOUNTER — ANESTHESIA (OUTPATIENT)
Facility: HOSPITAL | Age: 79
End: 2024-03-11
Payer: MEDICARE

## 2024-03-11 ENCOUNTER — HOSPITAL ENCOUNTER (OUTPATIENT)
Facility: HOSPITAL | Age: 79
Discharge: HOME | End: 2024-03-11
Attending: INTERNAL MEDICINE | Admitting: INTERNAL MEDICINE
Payer: MEDICARE

## 2024-03-11 VITALS
OXYGEN SATURATION: 94 % | HEIGHT: 65 IN | WEIGHT: 172 LBS | TEMPERATURE: 98.1 F | SYSTOLIC BLOOD PRESSURE: 145 MMHG | HEART RATE: 101 BPM | BODY MASS INDEX: 28.66 KG/M2 | DIASTOLIC BLOOD PRESSURE: 90 MMHG | RESPIRATION RATE: 17 BRPM

## 2024-03-11 DIAGNOSIS — I48.91 A-FIB (HCC): ICD-10-CM

## 2024-03-11 DIAGNOSIS — I48.11 LONGSTANDING PERSISTENT ATRIAL FIBRILLATION (HCC): Primary | ICD-10-CM

## 2024-03-11 LAB
BASOPHILS # BLD: 0.1 K/UL (ref 0–0.1)
BASOPHILS NFR BLD: 1 % (ref 0–1)
DIFFERENTIAL METHOD BLD: ABNORMAL
ECHO BSA: 1.89 M2
ECHO BSA: 1.89 M2
EOSINOPHIL # BLD: 0.1 K/UL (ref 0–0.4)
EOSINOPHIL NFR BLD: 1 % (ref 0–7)
ERYTHROCYTE [DISTWIDTH] IN BLOOD BY AUTOMATED COUNT: 16.2 % (ref 11.5–14.5)
HCT VFR BLD AUTO: 42.8 % (ref 35–47)
HGB BLD-MCNC: 12.7 G/DL (ref 11.5–16)
IMM GRANULOCYTES # BLD AUTO: 0 K/UL (ref 0–0.04)
IMM GRANULOCYTES NFR BLD AUTO: 0 % (ref 0–0.5)
LYMPHOCYTES # BLD: 2.4 K/UL (ref 0.8–3.5)
LYMPHOCYTES NFR BLD: 32 % (ref 12–49)
MCH RBC QN AUTO: 24 PG (ref 26–34)
MCHC RBC AUTO-ENTMCNC: 29.7 G/DL (ref 30–36.5)
MCV RBC AUTO: 80.8 FL (ref 80–99)
MONOCYTES # BLD: 0.6 K/UL (ref 0–1)
MONOCYTES NFR BLD: 8 % (ref 5–13)
NEUTS SEG # BLD: 4.4 K/UL (ref 1.8–8)
NEUTS SEG NFR BLD: 58 % (ref 32–75)
NRBC # BLD: 0 K/UL (ref 0–0.01)
NRBC BLD-RTO: 0 PER 100 WBC
PLATELET # BLD AUTO: 301 K/UL (ref 150–400)
PMV BLD AUTO: 9.7 FL (ref 8.9–12.9)
RBC # BLD AUTO: 5.3 M/UL (ref 3.8–5.2)
WBC # BLD AUTO: 7.5 K/UL (ref 3.6–11)

## 2024-03-11 PROCEDURE — 93312 ECHO TRANSESOPHAGEAL: CPT

## 2024-03-11 PROCEDURE — 7100000000 HC PACU RECOVERY - FIRST 15 MIN: Performed by: INTERNAL MEDICINE

## 2024-03-11 PROCEDURE — 3700000001 HC ADD 15 MINUTES (ANESTHESIA): Performed by: INTERNAL MEDICINE

## 2024-03-11 PROCEDURE — 7100000001 HC PACU RECOVERY - ADDTL 15 MIN: Performed by: INTERNAL MEDICINE

## 2024-03-11 PROCEDURE — 85025 COMPLETE CBC W/AUTO DIFF WBC: CPT

## 2024-03-11 PROCEDURE — 6360000002 HC RX W HCPCS: Performed by: NURSE ANESTHETIST, CERTIFIED REGISTERED

## 2024-03-11 PROCEDURE — 36415 COLL VENOUS BLD VENIPUNCTURE: CPT

## 2024-03-11 PROCEDURE — 93656 COMPRE EP EVAL ABLTJ ATR FIB: CPT | Performed by: INTERNAL MEDICINE

## 2024-03-11 PROCEDURE — 2709999900 HC NON-CHARGEABLE SUPPLY: Performed by: INTERNAL MEDICINE

## 2024-03-11 PROCEDURE — 2580000003 HC RX 258: Performed by: NURSE ANESTHETIST, CERTIFIED REGISTERED

## 2024-03-11 PROCEDURE — 3700000000 HC ANESTHESIA ATTENDED CARE: Performed by: INTERNAL MEDICINE

## 2024-03-11 RX ORDER — SODIUM CHLORIDE 0.9 % (FLUSH) 0.9 %
5-40 SYRINGE (ML) INJECTION EVERY 12 HOURS SCHEDULED
Status: DISCONTINUED | OUTPATIENT
Start: 2024-03-11 | End: 2024-03-11 | Stop reason: HOSPADM

## 2024-03-11 RX ORDER — MIDAZOLAM HYDROCHLORIDE 1 MG/ML
INJECTION INTRAMUSCULAR; INTRAVENOUS PRN
Status: DISCONTINUED | OUTPATIENT
Start: 2024-03-11 | End: 2024-03-11 | Stop reason: SDUPTHER

## 2024-03-11 RX ORDER — FENTANYL CITRATE 50 UG/ML
INJECTION, SOLUTION INTRAMUSCULAR; INTRAVENOUS PRN
Status: DISCONTINUED | OUTPATIENT
Start: 2024-03-11 | End: 2024-03-11 | Stop reason: SDUPTHER

## 2024-03-11 RX ORDER — ONDANSETRON 2 MG/ML
INJECTION INTRAMUSCULAR; INTRAVENOUS PRN
Status: DISCONTINUED | OUTPATIENT
Start: 2024-03-11 | End: 2024-03-11 | Stop reason: SDUPTHER

## 2024-03-11 RX ORDER — SODIUM CHLORIDE 0.9 % (FLUSH) 0.9 %
5-40 SYRINGE (ML) INJECTION PRN
Status: DISCONTINUED | OUTPATIENT
Start: 2024-03-11 | End: 2024-03-11 | Stop reason: HOSPADM

## 2024-03-11 RX ORDER — SODIUM CHLORIDE 9 MG/ML
INJECTION, SOLUTION INTRAVENOUS CONTINUOUS PRN
Status: DISCONTINUED | OUTPATIENT
Start: 2024-03-11 | End: 2024-03-11 | Stop reason: SDUPTHER

## 2024-03-11 RX ORDER — SUCCINYLCHOLINE CHLORIDE 20 MG/ML
INJECTION INTRAMUSCULAR; INTRAVENOUS PRN
Status: DISCONTINUED | OUTPATIENT
Start: 2024-03-11 | End: 2024-03-11 | Stop reason: SDUPTHER

## 2024-03-11 RX ORDER — ACETAMINOPHEN 325 MG/1
650 TABLET ORAL EVERY 4 HOURS PRN
Status: DISCONTINUED | OUTPATIENT
Start: 2024-03-11 | End: 2024-03-11 | Stop reason: HOSPADM

## 2024-03-11 RX ORDER — SODIUM CHLORIDE 9 MG/ML
INJECTION, SOLUTION INTRAVENOUS PRN
Status: DISCONTINUED | OUTPATIENT
Start: 2024-03-11 | End: 2024-03-11 | Stop reason: HOSPADM

## 2024-03-11 RX ADMIN — ONDANSETRON HYDROCHLORIDE 4 MG: 2 INJECTION, SOLUTION INTRAMUSCULAR; INTRAVENOUS at 08:54

## 2024-03-11 RX ADMIN — PROPOFOL 100 MG: 10 INJECTION, EMULSION INTRAVENOUS at 08:42

## 2024-03-11 RX ADMIN — FENTANYL CITRATE 50 MCG: 50 INJECTION, SOLUTION INTRAMUSCULAR; INTRAVENOUS at 08:42

## 2024-03-11 RX ADMIN — PHENYLEPHRINE HYDROCHLORIDE 40 MCG/MIN: 10 INJECTION INTRAVENOUS at 08:42

## 2024-03-11 RX ADMIN — SODIUM CHLORIDE: 9 INJECTION, SOLUTION INTRAVENOUS at 08:31

## 2024-03-11 RX ADMIN — MIDAZOLAM HYDROCHLORIDE 0.5 MG: 1 INJECTION, SOLUTION INTRAMUSCULAR; INTRAVENOUS at 08:34

## 2024-03-11 RX ADMIN — SUCCINYLCHOLINE CHLORIDE 120 MG: 20 INJECTION, SOLUTION INTRAMUSCULAR; INTRAVENOUS at 08:42

## 2024-03-11 NOTE — PROGRESS NOTES
Larry positive for MADISON thrombus    No AF abl attempted    Will dc eliquis and start xarelto. Will reimage in 6 weeks    Thank you for allowing me to participate in this patients care.    Jl Vicente MD, FACC, RS

## 2024-03-11 NOTE — ANESTHESIA PRE PROCEDURE
PROT 6.8 06/17/2021 10:10 AM    CALCIUM 9.5 06/17/2021 10:10 AM    BILITOT 0.5 06/17/2021 10:10 AM    ALKPHOS 89 06/17/2021 10:10 AM    AST 19 06/17/2021 10:10 AM    ALT 17 06/17/2021 10:10 AM       POC Tests: No results for input(s): \"POCGLU\", \"POCNA\", \"POCK\", \"POCCL\", \"POCBUN\", \"POCHEMO\", \"POCHCT\" in the last 72 hours.    Coags: No results found for: \"PROTIME\", \"INR\", \"APTT\"    HCG (If Applicable): No results found for: \"PREGTESTUR\", \"PREGSERUM\", \"HCG\", \"HCGQUANT\"     ABGs: No results found for: \"PHART\", \"PO2ART\", \"JEO4SMQ\", \"HWV3BEF\", \"BEART\", \"S9YNODST\"     Type & Screen (If Applicable):  No results found for: \"LABABO\", \"LABRH\"    Drug/Infectious Status (If Applicable):  No results found for: \"HIV\", \"HEPCAB\"    COVID-19 Screening (If Applicable): No results found for: \"COVID19\"        Anesthesia Evaluation  Patient summary reviewed and Nursing notes reviewed  Airway: Mallampati: III  TM distance: >3 FB   Neck ROM: full  Mouth opening: > = 3 FB   Dental: normal exam         Pulmonary:Negative Pulmonary ROS breath sounds clear to auscultation                             Cardiovascular:  Exercise tolerance: good (>4 METS)  (+) hypertension:, dysrhythmias: atrial fibrillation      ECG reviewed  Rhythm: irregular  Rate: normal           Beta Blocker:  Dose within 24 Hrs         Neuro/Psych:   Negative Neuro/Psych ROS              GI/Hepatic/Renal:   (+) GERD: well controlled, renal disease: CRI         ROS comment: Sp kidney tx for polycystic kidney .   Endo/Other: Negative Endo/Other ROS             Pt had no PAT visit       Abdominal:             Vascular: negative vascular ROS.         Other Findings:             Anesthesia Plan      general     ASA 3       Induction: intravenous.    MIPS: Prophylactic antiemetics administered.  Anesthetic plan and risks discussed with patient.      Plan discussed with CRNA.                    Sagrario Newell,    3/11/2024

## 2024-03-11 NOTE — PROGRESS NOTES
Dual Skin preformed with SHIKHA Márquez    Cardiac Cath Lab Recovery Arrival Note:      Anitha PARKS May arrived to Cardiac Cath Lab, Recovery Area. Staff introduced to patient. Patient identifiers verified with NAME and DATE OF BIRTH. Procedure verified with patient. Consent forms reviewed and signed by patient or authorized representative and verified. Allergies verified.     Patient and family oriented to department. Patient and family informed of procedure and plan of care.     Questions answered with review. Patient prepped for procedure, per orders from physician, prior to arrival.    Patient on cardiac monitor, non-invasive blood pressure, SPO2 monitor. On RA. Patient is A&Ox 4. Patient reports no pain.     Patient in stretcher, in low position, with side rails up, call bell within reach, patient instructed to call if assistance as needed.    Patient prep in: [facility] Recovery Area, [place] [address].     Family in: anirudh Fabian.   Prep by: Yun

## 2024-03-11 NOTE — PROGRESS NOTES
I have reviewed discharge instructions with the patient.  The patient verbalized understanding.    Discharge medications reviewed with patient and appropriate educational materials regarding medications and side effects teaching were provided.    Site care instructions reviewed with patient. Pain management teaching completed.    Patient instructed to make follow up appointments per discharge instructions.     Patient belongings packed up and accounted for with patient and family. All patient belongings sent home with patient.    Telemetry monitor and wires removed      Patient signed discharge instructions after reviewing them, and duplicate copy placed in chart.

## 2024-03-11 NOTE — ANESTHESIA POSTPROCEDURE EVALUATION
Department of Anesthesiology  Postprocedure Note    Patient: Anitha Ta  MRN: [id number]  YOB: 1945  Date of evaluation: 3/11/2024    Procedure Summary       Date: 03/11/24 Room / Location: hospitals EP LAB / hospitals CARDIAC CATH LAB    Anesthesia Start: 0831 Anesthesia Stop: 0911    Procedure: Ablation A-fib w complete ep study Diagnosis: Longstanding persistent atrial fibrillation (HCC)    Providers: Jl Vicente MD Responsible Provider: Sagrario Newell DO    Anesthesia Type: General ASA Status: 3            Anesthesia Type: General    Stephane Phase I: Stephane Score: 10    Stephane Phase II:      Anesthesia Post Evaluation    Patient location during evaluation: PACU  Patient participation: complete - patient participated  Level of consciousness: awake and alert  Pain score: 0  Airway patency: patent  Nausea & Vomiting: no nausea and no vomiting  Cardiovascular status: hemodynamically stable  Respiratory status: acceptable  Hydration status: euvolemic  Pain management: adequate    There were no known notable events for this encounter.

## 2024-06-03 ENCOUNTER — HOSPITAL ENCOUNTER (OUTPATIENT)
Facility: HOSPITAL | Age: 79
Discharge: HOME | End: 2024-06-03
Attending: INTERNAL MEDICINE | Admitting: INTERNAL MEDICINE
Payer: MEDICARE

## 2024-06-03 ENCOUNTER — HOSPITAL ENCOUNTER (OUTPATIENT)
Facility: HOSPITAL | Age: 79
Discharge: HOME | End: 2024-06-05
Attending: INTERNAL MEDICINE
Payer: MEDICARE

## 2024-06-03 ENCOUNTER — ANESTHESIA EVENT (OUTPATIENT)
Facility: HOSPITAL | Age: 79
End: 2024-06-03
Payer: MEDICARE

## 2024-06-03 ENCOUNTER — ANESTHESIA (OUTPATIENT)
Facility: HOSPITAL | Age: 79
End: 2024-06-03
Payer: MEDICARE

## 2024-06-03 VITALS
DIASTOLIC BLOOD PRESSURE: 77 MMHG | RESPIRATION RATE: 14 BRPM | BODY MASS INDEX: 28.99 KG/M2 | HEART RATE: 67 BPM | WEIGHT: 174 LBS | SYSTOLIC BLOOD PRESSURE: 126 MMHG | OXYGEN SATURATION: 100 % | HEIGHT: 65 IN | TEMPERATURE: 99.2 F

## 2024-06-03 VITALS
RESPIRATION RATE: 18 BRPM | OXYGEN SATURATION: 95 % | SYSTOLIC BLOOD PRESSURE: 157 MMHG | DIASTOLIC BLOOD PRESSURE: 70 MMHG | HEART RATE: 87 BPM

## 2024-06-03 DIAGNOSIS — I48.11 LONGSTANDING PERSISTENT ATRIAL FIBRILLATION (HCC): Primary | ICD-10-CM

## 2024-06-03 DIAGNOSIS — I48.91 ATRIAL FIBRILLATION (HCC): ICD-10-CM

## 2024-06-03 DIAGNOSIS — R07.9 CHEST PAIN: ICD-10-CM

## 2024-06-03 DIAGNOSIS — I48.91 A-FIB (HCC): ICD-10-CM

## 2024-06-03 LAB
ACT BLD: 374 SECS (ref 79–138)
ANION GAP SERPL CALC-SCNC: 4 MMOL/L (ref 5–15)
BASOPHILS # BLD: 0.1 K/UL (ref 0–0.1)
BASOPHILS NFR BLD: 1 % (ref 0–1)
BUN SERPL-MCNC: 17 MG/DL (ref 6–20)
BUN/CREAT SERPL: 16 (ref 12–20)
CALCIUM SERPL-MCNC: 8.6 MG/DL (ref 8.5–10.1)
CHLORIDE SERPL-SCNC: 113 MMOL/L (ref 97–108)
CO2 SERPL-SCNC: 24 MMOL/L (ref 21–32)
CREAT SERPL-MCNC: 1.08 MG/DL (ref 0.55–1.02)
DIFFERENTIAL METHOD BLD: ABNORMAL
ECHO BSA: 1.9 M2
EOSINOPHIL # BLD: 0.1 K/UL (ref 0–0.4)
EOSINOPHIL NFR BLD: 1 % (ref 0–7)
ERYTHROCYTE [DISTWIDTH] IN BLOOD BY AUTOMATED COUNT: 15.9 % (ref 11.5–14.5)
GLUCOSE BLD STRIP.AUTO-MCNC: 154 MG/DL (ref 65–117)
GLUCOSE SERPL-MCNC: 111 MG/DL (ref 65–100)
HCT VFR BLD AUTO: 39.1 % (ref 35–47)
HGB BLD-MCNC: 12 G/DL (ref 11.5–16)
IMM GRANULOCYTES # BLD AUTO: 0 K/UL (ref 0–0.04)
IMM GRANULOCYTES NFR BLD AUTO: 0 % (ref 0–0.5)
LYMPHOCYTES # BLD: 1.7 K/UL (ref 0.8–3.5)
LYMPHOCYTES NFR BLD: 24 % (ref 12–49)
MCH RBC QN AUTO: 23.5 PG (ref 26–34)
MCHC RBC AUTO-ENTMCNC: 30.7 G/DL (ref 30–36.5)
MCV RBC AUTO: 76.7 FL (ref 80–99)
MONOCYTES # BLD: 0.4 K/UL (ref 0–1)
MONOCYTES NFR BLD: 6 % (ref 5–13)
NEUTS SEG # BLD: 4.7 K/UL (ref 1.8–8)
NEUTS SEG NFR BLD: 68 % (ref 32–75)
NRBC # BLD: 0 K/UL (ref 0–0.01)
NRBC BLD-RTO: 0 PER 100 WBC
PLATELET # BLD AUTO: 300 K/UL (ref 150–400)
PMV BLD AUTO: 10.6 FL (ref 8.9–12.9)
POTASSIUM SERPL-SCNC: 4.5 MMOL/L (ref 3.5–5.1)
RBC # BLD AUTO: 5.1 M/UL (ref 3.8–5.2)
SERVICE CMNT-IMP: ABNORMAL
SODIUM SERPL-SCNC: 141 MMOL/L (ref 136–145)
WBC # BLD AUTO: 7 K/UL (ref 3.6–11)

## 2024-06-03 PROCEDURE — 2500000003 HC RX 250 WO HCPCS

## 2024-06-03 PROCEDURE — 6360000002 HC RX W HCPCS: Performed by: INTERNAL MEDICINE

## 2024-06-03 PROCEDURE — 6370000000 HC RX 637 (ALT 250 FOR IP): Performed by: INTERNAL MEDICINE

## 2024-06-03 PROCEDURE — 36415 COLL VENOUS BLD VENIPUNCTURE: CPT

## 2024-06-03 PROCEDURE — C1769 GUIDE WIRE: HCPCS | Performed by: INTERNAL MEDICINE

## 2024-06-03 PROCEDURE — 80048 BASIC METABOLIC PNL TOTAL CA: CPT

## 2024-06-03 PROCEDURE — C1894 INTRO/SHEATH, NON-LASER: HCPCS | Performed by: INTERNAL MEDICINE

## 2024-06-03 PROCEDURE — 2709999900 HC NON-CHARGEABLE SUPPLY: Performed by: INTERNAL MEDICINE

## 2024-06-03 PROCEDURE — 93355 ECHO TRANSESOPHAGEAL (TEE): CPT | Performed by: INTERNAL MEDICINE

## 2024-06-03 PROCEDURE — 85025 COMPLETE CBC W/AUTO DIFF WBC: CPT

## 2024-06-03 PROCEDURE — 2580000003 HC RX 258

## 2024-06-03 PROCEDURE — 85347 COAGULATION TIME ACTIVATED: CPT

## 2024-06-03 PROCEDURE — C1766 INTRO/SHEATH,STRBLE,NON-PEEL: HCPCS | Performed by: INTERNAL MEDICINE

## 2024-06-03 PROCEDURE — 51798 US URINE CAPACITY MEASURE: CPT

## 2024-06-03 PROCEDURE — 51701 INSERT BLADDER CATHETER: CPT

## 2024-06-03 PROCEDURE — 6360000002 HC RX W HCPCS

## 2024-06-03 PROCEDURE — 93656 COMPRE EP EVAL ABLTJ ATR FIB: CPT | Performed by: INTERNAL MEDICINE

## 2024-06-03 PROCEDURE — 7100000001 HC PACU RECOVERY - ADDTL 15 MIN: Performed by: INTERNAL MEDICINE

## 2024-06-03 PROCEDURE — C1733 CATH, EP, OTHR THAN COOL-TIP: HCPCS | Performed by: INTERNAL MEDICINE

## 2024-06-03 PROCEDURE — C1759 CATH, INTRA ECHOCARDIOGRAPHY: HCPCS | Performed by: INTERNAL MEDICINE

## 2024-06-03 PROCEDURE — C1731 CATH, EP, 20 OR MORE ELEC: HCPCS | Performed by: INTERNAL MEDICINE

## 2024-06-03 PROCEDURE — 3700000000 HC ANESTHESIA ATTENDED CARE: Performed by: INTERNAL MEDICINE

## 2024-06-03 PROCEDURE — 2580000003 HC RX 258: Performed by: INTERNAL MEDICINE

## 2024-06-03 PROCEDURE — 3700000001 HC ADD 15 MINUTES (ANESTHESIA): Performed by: INTERNAL MEDICINE

## 2024-06-03 PROCEDURE — 82962 GLUCOSE BLOOD TEST: CPT

## 2024-06-03 PROCEDURE — 7100000000 HC PACU RECOVERY - FIRST 15 MIN: Performed by: INTERNAL MEDICINE

## 2024-06-03 PROCEDURE — 93312 ECHO TRANSESOPHAGEAL: CPT

## 2024-06-03 PROCEDURE — 2500000003 HC RX 250 WO HCPCS: Performed by: INTERNAL MEDICINE

## 2024-06-03 RX ORDER — PROTAMINE SULFATE 10 MG/ML
INJECTION, SOLUTION INTRAVENOUS PRN
Status: DISCONTINUED | OUTPATIENT
Start: 2024-06-03 | End: 2024-06-03 | Stop reason: SDUPTHER

## 2024-06-03 RX ORDER — CARVEDILOL 6.25 MG/1
6.25 TABLET ORAL 2 TIMES DAILY WITH MEALS
Status: DISCONTINUED | OUTPATIENT
Start: 2024-06-03 | End: 2024-06-03 | Stop reason: HOSPADM

## 2024-06-03 RX ORDER — PHENYLEPHRINE HCL IN 0.9% NACL 0.4MG/10ML
SYRINGE (ML) INTRAVENOUS PRN
Status: DISCONTINUED | OUTPATIENT
Start: 2024-06-03 | End: 2024-06-03 | Stop reason: SDUPTHER

## 2024-06-03 RX ORDER — LIDOCAINE HYDROCHLORIDE 20 MG/ML
INJECTION, SOLUTION EPIDURAL; INFILTRATION; INTRACAUDAL; PERINEURAL PRN
Status: DISCONTINUED | OUTPATIENT
Start: 2024-06-03 | End: 2024-06-03 | Stop reason: SDUPTHER

## 2024-06-03 RX ORDER — FENTANYL CITRATE 50 UG/ML
INJECTION, SOLUTION INTRAMUSCULAR; INTRAVENOUS PRN
Status: DISCONTINUED | OUTPATIENT
Start: 2024-06-03 | End: 2024-06-03 | Stop reason: HOSPADM

## 2024-06-03 RX ORDER — DEXAMETHASONE SODIUM PHOSPHATE 4 MG/ML
INJECTION, SOLUTION INTRA-ARTICULAR; INTRALESIONAL; INTRAMUSCULAR; INTRAVENOUS; SOFT TISSUE PRN
Status: DISCONTINUED | OUTPATIENT
Start: 2024-06-03 | End: 2024-06-03 | Stop reason: SDUPTHER

## 2024-06-03 RX ORDER — LIDOCAINE HYDROCHLORIDE 20 MG/ML
SOLUTION OROPHARYNGEAL PRN
Status: DISCONTINUED | OUTPATIENT
Start: 2024-06-03 | End: 2024-06-03 | Stop reason: HOSPADM

## 2024-06-03 RX ORDER — HEPARIN SODIUM 1000 [USP'U]/ML
INJECTION, SOLUTION INTRAVENOUS; SUBCUTANEOUS PRN
Status: DISCONTINUED | OUTPATIENT
Start: 2024-06-03 | End: 2024-06-03 | Stop reason: SDUPTHER

## 2024-06-03 RX ORDER — MIDAZOLAM HYDROCHLORIDE 1 MG/ML
INJECTION INTRAMUSCULAR; INTRAVENOUS PRN
Status: DISCONTINUED | OUTPATIENT
Start: 2024-06-03 | End: 2024-06-03 | Stop reason: HOSPADM

## 2024-06-03 RX ORDER — SODIUM CHLORIDE 9 MG/ML
INJECTION, SOLUTION INTRAVENOUS CONTINUOUS PRN
Status: DISCONTINUED | OUTPATIENT
Start: 2024-06-03 | End: 2024-06-03 | Stop reason: SDUPTHER

## 2024-06-03 RX ORDER — AMLODIPINE BESYLATE 5 MG/1
5 TABLET ORAL DAILY
Status: DISCONTINUED | OUTPATIENT
Start: 2024-06-03 | End: 2024-06-03 | Stop reason: HOSPADM

## 2024-06-03 RX ORDER — ACETAMINOPHEN 325 MG/1
650 TABLET ORAL EVERY 4 HOURS PRN
Status: DISCONTINUED | OUTPATIENT
Start: 2024-06-03 | End: 2024-06-03 | Stop reason: HOSPADM

## 2024-06-03 RX ORDER — MIDAZOLAM HYDROCHLORIDE 1 MG/ML
INJECTION INTRAMUSCULAR; INTRAVENOUS PRN
Status: COMPLETED | OUTPATIENT
Start: 2024-06-03 | End: 2024-06-03

## 2024-06-03 RX ORDER — FENTANYL CITRATE 50 UG/ML
INJECTION, SOLUTION INTRAMUSCULAR; INTRAVENOUS PRN
Status: DISCONTINUED | OUTPATIENT
Start: 2024-06-03 | End: 2024-06-03 | Stop reason: SDUPTHER

## 2024-06-03 RX ORDER — ROCURONIUM BROMIDE 10 MG/ML
INJECTION, SOLUTION INTRAVENOUS PRN
Status: DISCONTINUED | OUTPATIENT
Start: 2024-06-03 | End: 2024-06-03 | Stop reason: SDUPTHER

## 2024-06-03 RX ORDER — LIDOCAINE HYDROCHLORIDE 20 MG/ML
SOLUTION OROPHARYNGEAL PRN
Status: COMPLETED | OUTPATIENT
Start: 2024-06-03 | End: 2024-06-03

## 2024-06-03 RX ORDER — FENTANYL CITRATE 50 UG/ML
INJECTION, SOLUTION INTRAMUSCULAR; INTRAVENOUS PRN
Status: COMPLETED | OUTPATIENT
Start: 2024-06-03 | End: 2024-06-03

## 2024-06-03 RX ORDER — ONDANSETRON 2 MG/ML
INJECTION INTRAMUSCULAR; INTRAVENOUS PRN
Status: DISCONTINUED | OUTPATIENT
Start: 2024-06-03 | End: 2024-06-03 | Stop reason: SDUPTHER

## 2024-06-03 RX ORDER — SODIUM CHLORIDE 0.9 % (FLUSH) 0.9 %
5-40 SYRINGE (ML) INJECTION EVERY 12 HOURS SCHEDULED
Status: DISCONTINUED | OUTPATIENT
Start: 2024-06-03 | End: 2024-06-03 | Stop reason: HOSPADM

## 2024-06-03 RX ORDER — SODIUM CHLORIDE 9 MG/ML
INJECTION, SOLUTION INTRAVENOUS PRN
Status: DISCONTINUED | OUTPATIENT
Start: 2024-06-03 | End: 2024-06-03 | Stop reason: HOSPADM

## 2024-06-03 RX ORDER — AMIODARONE HYDROCHLORIDE 200 MG/1
200 TABLET ORAL 2 TIMES DAILY
Qty: 42 TABLET | Refills: 0 | Status: SHIPPED | OUTPATIENT
Start: 2024-06-03 | End: 2024-06-24

## 2024-06-03 RX ORDER — SUCCINYLCHOLINE CHLORIDE 20 MG/ML
INJECTION INTRAMUSCULAR; INTRAVENOUS PRN
Status: DISCONTINUED | OUTPATIENT
Start: 2024-06-03 | End: 2024-06-03 | Stop reason: SDUPTHER

## 2024-06-03 RX ORDER — SODIUM CHLORIDE 0.9 % (FLUSH) 0.9 %
5-40 SYRINGE (ML) INJECTION PRN
Status: DISCONTINUED | OUTPATIENT
Start: 2024-06-03 | End: 2024-06-03 | Stop reason: HOSPADM

## 2024-06-03 RX ADMIN — FENTANYL CITRATE 25 MCG: 50 INJECTION, SOLUTION INTRAMUSCULAR; INTRAVENOUS at 11:58

## 2024-06-03 RX ADMIN — LIDOCAINE HYDROCHLORIDE 15 ML: 20 SOLUTION ORAL at 08:41

## 2024-06-03 RX ADMIN — BENZOCAINE, BUTAMBEN, AND TETRACAINE HYDROCHLORIDE 2 SPRAY: .028; .004; .004 AEROSOL, SPRAY TOPICAL at 08:41

## 2024-06-03 RX ADMIN — PROPOFOL 100 MG: 10 INJECTION, EMULSION INTRAVENOUS at 11:58

## 2024-06-03 RX ADMIN — FENTANYL CITRATE 50 MCG: 50 INJECTION, SOLUTION INTRAMUSCULAR; INTRAVENOUS at 08:41

## 2024-06-03 RX ADMIN — PROTAMINE SULFATE 100 MG: 10 INJECTION, SOLUTION INTRAVENOUS at 13:01

## 2024-06-03 RX ADMIN — LIDOCAINE HYDROCHLORIDE 100 MG: 20 INJECTION, SOLUTION EPIDURAL; INFILTRATION; INTRACAUDAL; PERINEURAL at 11:58

## 2024-06-03 RX ADMIN — DEXAMETHASONE SODIUM PHOSPHATE 4 MG: 4 INJECTION, SOLUTION INTRAMUSCULAR; INTRAVENOUS at 12:16

## 2024-06-03 RX ADMIN — ONDANSETRON HYDROCHLORIDE 4 MG: 2 INJECTION, SOLUTION INTRAMUSCULAR; INTRAVENOUS at 12:57

## 2024-06-03 RX ADMIN — AMLODIPINE BESYLATE 5 MG: 5 TABLET ORAL at 18:21

## 2024-06-03 RX ADMIN — PHENYLEPHRINE HYDROCHLORIDE 40 MCG/MIN: 10 INJECTION INTRAVENOUS at 12:11

## 2024-06-03 RX ADMIN — CARVEDILOL 6.25 MG: 6.25 TABLET, FILM COATED ORAL at 18:21

## 2024-06-03 RX ADMIN — SUCCINYLCHOLINE CHLORIDE 160 MG: 20 INJECTION, SOLUTION INTRAMUSCULAR; INTRAVENOUS at 11:58

## 2024-06-03 RX ADMIN — Medication 80 MCG: at 12:07

## 2024-06-03 RX ADMIN — SODIUM CHLORIDE: 9 INJECTION, SOLUTION INTRAVENOUS at 11:47

## 2024-06-03 RX ADMIN — MIDAZOLAM HYDROCHLORIDE 3 MG: 1 INJECTION, SOLUTION INTRAMUSCULAR; INTRAVENOUS at 08:41

## 2024-06-03 RX ADMIN — ROCURONIUM BROMIDE 5 MG: 10 INJECTION INTRAVENOUS at 11:58

## 2024-06-03 RX ADMIN — SUGAMMADEX 200 MG: 100 INJECTION, SOLUTION INTRAVENOUS at 13:07

## 2024-06-03 RX ADMIN — HEPARIN SODIUM 14000 UNITS: 1000 INJECTION, SOLUTION INTRAVENOUS; SUBCUTANEOUS at 12:15

## 2024-06-03 NOTE — PROGRESS NOTES
Pt go up to perform walk test and walk to restroom; FDC through walk pt began to bleed from bilateral groin sites. Pt assisted to stretcher brought over by other recovery RN without incident. Bleeding under control upon pt sitting on bed. Jules MEDLEY contacted by CLEMENCIA Davis RN and per MD, pt to restart bedrest and d/c to home afterward.

## 2024-06-03 NOTE — DISCHARGE INSTRUCTIONS
[facility]  [address] [place] [address]  [phone]  [email or web address]       ATRIAL FIBRILLATION ABLATION DISCHARGE INSTRUCTIONS    Patient ID:  Anitha Ta  [id number]  78 y.o.  1945    Admit Date: 6/3/2024    Discharge Date: 6/3/2024     Admitting Physician: [unfilled]     Discharge Physician: [unfilled]    Admission Diagnoses:   A-fib (HCC) [I48.91]    Discharge Diagnoses:   [unfilled]    Discharge Condition: Good    Cardiology Procedures this Admission:  Atrial fibrillation.     Disposition: home    Reference discharge instructions provided by nursing for diet and activity.    Follow-up with Dr Vicente or his Nurse Practitioners in 1-2 weeks.  Call [phone] to make an appointment.    Signed:  Jl Vicente MD  6/3/2024  1:06 PM    S/P ATRIAL FIBRILLATION ABLATION DISCHARGE INSTRUCTIONS    It is normal to feel tired the first couple days.  Take it easy and follow the physician’s instructions.      CHECK THE CATHETER INSERTION SITE DAILY:  You may shower 24 hours after the procedure, remove the bandage during showering.  Wash with soap and water and pat dry.  Gentle cleaning of the site with soap and water is sufficient, cover with a dry clean dressing or bandage.  Do not apply creams or powders to the area.  Do not sit in a bathtub or pool of water for 7 days or until wound has completely healed.  Temporary bruising and discomfort is normal and may last a few weeks.  You may have a  formation of a small lump at the site which may last up to 6 weeks.    CALL THE PHYSICIAN:  If the site becomes red, swollen or feels warm to the touch  If there is bleeding or drainage or if there is unusual pain at the groin or down the leg.  If there is any bleeding, lie down, apply pressure or have someone apply pressure with a clean cloth until the bleeding stops.  If the bleeding continues, call 911 to be transported to the hospital.  DO NOT DRIVE YOURSELF, OR HAVE

## 2024-06-03 NOTE — PROGRESS NOTES
Cardiac Cath Lab Recovery Arrival Note:      Anitha PARKS May arrived to Cardiac Cath Lab, Recovery Area. Staff introduced to patient. Patient identifiers verified with NAME and DATE OF BIRTH. Procedure verified with patient. Consent forms reviewed and signed by patient or authorized representative and verified. Allergies verified.     Patient and family oriented to department. Patient and family informed of procedure and plan of care.     Questions answered with review. Patient prepped for procedure, per orders from physician, prior to arrival.    Patient on cardiac monitor, non-invasive blood pressure, SPO2 monitor. On RA. Patient is A&Ox 4. Patient reports NO PAIN.     Patient in stretcher, in low position, with side rails up, call bell within reach, patient instructed to call if assistance as needed.    Patient prep in: [facility] Recovery Area, [facility].   Patient family has pager # N/A  Family in: NAM.   Prep by: REBECCA

## 2024-06-03 NOTE — PROGRESS NOTES
Pt arrived to unit. Bilateral groin sites CDI, soft to palpation. A&O x4. Temp 98.3. /96. HR 71. SPO2 100.

## 2024-06-03 NOTE — ANESTHESIA PRE PROCEDURE
Department of Anesthesiology  Preprocedure Note       Name:  Anitha Ta   Age:  78 y.o.  :  1945                                          MRN:  [id number]         Date:  6/3/2024      Surgeon: Surgeon(s):  Jl Vicente MD    Procedure: Procedure(s):  Ablation A-fib w complete ep study    Medications prior to admission:   Prior to Admission medications    Medication Sig Start Date End Date Taking? Authorizing Provider   rivaroxaban (XARELTO) 20 MG TABS tablet Take 1 tablet by mouth daily (with breakfast) 3/11/24   Jl Vicente MD   amLODIPine (NORVASC) 5 MG tablet Take 1 tablet by mouth daily    ProviderItalia MD   atorvastatin (LIPITOR) 40 MG tablet Take 1 tablet by mouth daily    ProviderItalia MD   carvedilol (COREG) 6.25 MG tablet Take 1 tablet by mouth 2 times daily 10/24/23   ProviderItalia MD   ibandronate (BONIVA) 150 MG tablet TAKE 150 MG BY MOUTH EVERY THIRTY (30) DAYS.  Patient not taking: Reported on 3/11/2024 7/17/23   Maru Wylie MD   vitamin D 25 MCG (1000 UT) CAPS Take 1 capsule by mouth daily    Automatic Reconciliation, Ar   cloNIDine (CATAPRES) 0.2 MG/24HR PTWK Place 1 patch onto the skin    Automatic Reconciliation, Ar   mycophenolate (CELLCEPT) 250 MG capsule Take 1 capsule by mouth 2 times daily    Automatic Reconciliation, Ar   omeprazole (PRILOSEC OTC) 20 MG tablet Take 1 tablet by mouth daily    Automatic Reconciliation, Ar   potassium chloride (KLOR-CON M) 20 MEQ extended release tablet Take 1 tablet by mouth 2 times daily 2 tab daily    Automatic Reconciliation, Ar   predniSONE (DELTASONE) 5 MG tablet Take 2.5 mg by mouth daily  Patient not taking: Reported on 2024    Automatic Reconciliation, Ar   tacrolimus (PROGRAF) 1 MG capsule Take by mouth    Automatic Reconciliation, Ar       Current medications:    No current facility-administered medications for this encounter.       Allergies:    Allergies   Allergen Reactions    Ace Inhibitors

## 2024-06-03 NOTE — FLOWSHEET NOTE
06/03/24 1344   Handoff   Communication Given Periop Handoff/Relief   Handoff phase Phase I receiving   Handoff Given To Macy TRIVEDI   Handoff Received From Elizabeth RN 1324, Akira CRNA 1329   Handoff Communication Face to Face;Telephone       0923    TRANSFER - OUT REPORT:    Verbal report given to Yun TRIVEDI on Anitha H May  being transferred to  recovery (unit) for routine post-op       Report consisted of patient’s Situation, Background, Assessment and   Recommendations(SBAR).     Information from the following report(s) Surgery Report, Intake/Output, MAR, Recent Results, Med Rec Status, and Cardiac Rhythm SR  was reviewed with the receiving nurse.    Opportunity for questions and clarification was provided.      Patient transported with:   Monitor  O2 @ 1 liters  Registered Nurse    Lines   This SmartLink retrieves the last documented value for LDA assessment data, retrieved by either LDA type or by LDA group ID.     This SmartLink should be used in a SmartText or SmartPhrase. If one is not available, please contact your .

## 2024-06-04 NOTE — FLOWSHEET NOTE
Predictive Model Details          23 (Normal)  Factor Value    Calculated 6/3/2024 21:30 57% Age 78 years old    Deterioration Index Model 18% Respiratory rate 14     12% Potassium 4.5 mmol/L     6% Pulse oximetry 100 %     4% Systolic 126     1% Temperature 99.2 °F (37.3 °C)     1% Pulse 67     0% Sodium 141 mmol/L     0% WBC count 7.0 K/uL     0% Hematocrit 39.1 %         06/03/24 2130   AVS Reviewed   AVS & discharge instructions reviewed with patient and/or representative? Yes   Reviewed instructions with Patient   Level of Understanding Questions answered;Teach back completed;Verbalized understanding     Site is clean, dry and intact with no bruising, heamtoma or pain. Patient up and walked to the bathroom and urinated. Site is clean, dry and intact with no bruising, heamtoma or pain.  Discharge paperwork reviewed. Time for questions provided and clarification given. PIV access removed without complication. Patients belongings gathered. Patient taken down and discharged.

## 2024-06-05 NOTE — ANESTHESIA POSTPROCEDURE EVALUATION
Department of Anesthesiology  Postprocedure Note    Patient: Anitha Ta  MRN: [id number]  YOB: 1945  Date of evaluation: 6/5/2024    Procedure Summary       Date: 06/03/24 Room / Location: [facility] EP LAB / [facility] CARDIAC CATH LAB    Anesthesia Start: 1147 Anesthesia Stop: 1330    Procedure: Ablation A-fib w complete ep study Diagnosis: Longstanding persistent atrial fibrillation (HCC)    Providers: Jl Vicente MD Responsible Provider: Josué Mcintyre MD    Anesthesia Type: General ASA Status: 3            Anesthesia Type: General    Stephane Phase I: Stephane Score: 10    Stephane Phase II:      Anesthesia Post Evaluation    Patient location during evaluation: PACU  Patient participation: complete - patient participated  Level of consciousness: responsive to verbal stimuli and sleepy but conscious  Pain score: 2  Airway patency: patent  Cardiovascular status: blood pressure returned to baseline  Respiratory status: acceptable  Hydration status: stable  Comments: +Post-Anesthesia Evaluation and Assessment    Patient: Anitha Ta MRN: [id number]  SSN: [id number]   YOB: 1945  Age: 78 y.o.  Sex: female          Cardiovascular Function/Vital Signs    /77   Pulse 67   Temp 99.2 °F (37.3 °C) (Oral)   Resp 14   Ht 1.651 m (5' 5\")   Wt 78.9 kg (174 lb)   SpO2 100%   BMI 28.96 kg/m²     Patient is status post Procedure(s):  Ablation A-fib w complete ep study.    Nausea/Vomiting: Controlled.    Postoperative hydration reviewed and adequate.    Pain:      Managed.    Neurological Status:       At baseline.    Mental Status and Level of Consciousness: Arousable.    Pulmonary Status:       Adequate oxygenation and airway patent.    Complications related to anesthesia: None    Post-anesthesia assessment completed. No concerns.    I have evaluated the patient and the patient is stable and ready to be discharged from PACU .    Signed By: Josué Mcintyre MD    6/5/2024    Multimodal analgesia

## 2024-06-27 ENCOUNTER — OFFICE VISIT (OUTPATIENT)
Age: 79
End: 2024-06-27
Payer: MEDICARE

## 2024-06-27 VITALS — WEIGHT: 171.8 LBS | HEIGHT: 65 IN | BODY MASS INDEX: 28.62 KG/M2

## 2024-06-27 DIAGNOSIS — M81.0 AGE-RELATED OSTEOPOROSIS WITHOUT CURRENT PATHOLOGICAL FRACTURE: Primary | ICD-10-CM

## 2024-06-27 PROCEDURE — 1036F TOBACCO NON-USER: CPT | Performed by: INTERNAL MEDICINE

## 2024-06-27 PROCEDURE — 1123F ACP DISCUSS/DSCN MKR DOCD: CPT | Performed by: INTERNAL MEDICINE

## 2024-06-27 PROCEDURE — 99214 OFFICE O/P EST MOD 30 MIN: CPT | Performed by: INTERNAL MEDICINE

## 2024-06-27 PROCEDURE — G8428 CUR MEDS NOT DOCUMENT: HCPCS | Performed by: INTERNAL MEDICINE

## 2024-06-27 PROCEDURE — G2211 COMPLEX E/M VISIT ADD ON: HCPCS | Performed by: INTERNAL MEDICINE

## 2024-06-27 PROCEDURE — G8419 CALC BMI OUT NRM PARAM NOF/U: HCPCS | Performed by: INTERNAL MEDICINE

## 2024-06-27 PROCEDURE — G8400 PT W/DXA NO RESULTS DOC: HCPCS | Performed by: INTERNAL MEDICINE

## 2024-06-27 PROCEDURE — 1090F PRES/ABSN URINE INCON ASSESS: CPT | Performed by: INTERNAL MEDICINE

## 2024-06-27 NOTE — PROGRESS NOTES
Chief Complaint   Patient presents with    Osteoporosis    Discuss Medications        History of Present Illness: Anitha Ta is a 79 y.o. female with a past medical history significant for autosomal dominant polycystic  kidney disease status post kidney transplant 01/2016 presenting in follow up for discussion related to osteopenia.       01/13/2021:   Is taking prednisone 5mg daily, has been taking since transplant. Previously took fosamax, PCP recommended she stop it in 2011- at that time had been taking it for >5 years. Menarche occurred age 18, menopause in her 40s. Creatinine was 1.2 on most recent  assessment, last labs were in November. Does not take vitamin D. No hx of fractures, had sprain last fall of R ankle.      01/27/2021:   Took first vitamin D pill last week.  Was wondering why her PTH is high.  States that her creatinine is typically 1.2.  Took an oral bisphosphonate many years ago.  Is concerned about her fasting blood glucose level on the recent labs.  Does exercise  around her house for 30 minutes.  Is trying to watch sweets.      06/21/2022: TANIYA Wyman is transplant clinic provider, also seeing Nathalia Murdock MD for Nephrology.  Does not have acid reflux following the monthly Boniva dose.  Continues taking vitamin D daily, 1000 international units.  Attempt to obtain  calcium via the diet.  No falls or fractures since her last visit.  Received 1 dose of Reclast in June 2021.   01/2022 Damaso visit:          02/26/2024: Since last visit, mini-stroke now on elequis. DBP today 90s- not usually that high. Taking 1000IU daily of vitamin D.        06/27/2024: Prednisone was dropped down to 2.5 mg once daily.  DEXA from February shows lowest T-score of -2.2.  Has been taking Boniva for 3 years now.    Current Outpatient Medications:     rivaroxaban (XARELTO) 20 MG TABS tablet, Take 1 tablet by mouth daily (with breakfast), Disp: 90 tablet, Rfl: 1    amLODIPine (NORVASC) 5 MG tablet,

## 2025-01-27 ENCOUNTER — TRANSCRIBE ORDERS (OUTPATIENT)
Facility: HOSPITAL | Age: 80
End: 2025-01-27

## 2025-01-27 DIAGNOSIS — Z12.31 OTHER SCREENING MAMMOGRAM: Primary | ICD-10-CM

## 2025-02-26 ENCOUNTER — HOSPITAL ENCOUNTER (OUTPATIENT)
Facility: HOSPITAL | Age: 80
Discharge: HOME | End: 2025-03-01
Payer: MEDICARE

## 2025-02-26 DIAGNOSIS — Z12.31 OTHER SCREENING MAMMOGRAM: ICD-10-CM

## 2025-02-26 PROCEDURE — 77063 BREAST TOMOSYNTHESIS BI: CPT

## 2025-05-12 ENCOUNTER — ANESTHESIA EVENT (OUTPATIENT)
Facility: HOSPITAL | Age: 80
End: 2025-05-12
Payer: MEDICARE

## 2025-05-12 ENCOUNTER — HOSPITAL ENCOUNTER (OUTPATIENT)
Facility: HOSPITAL | Age: 80
Discharge: HOME | End: 2025-05-13
Attending: INTERNAL MEDICINE | Admitting: INTERNAL MEDICINE
Payer: MEDICARE

## 2025-05-12 ENCOUNTER — HOSPITAL ENCOUNTER (OUTPATIENT)
Facility: HOSPITAL | Age: 80
Discharge: HOME | End: 2025-05-14
Attending: INTERNAL MEDICINE
Payer: MEDICARE

## 2025-05-12 ENCOUNTER — ANESTHESIA (OUTPATIENT)
Facility: HOSPITAL | Age: 80
End: 2025-05-12
Payer: MEDICARE

## 2025-05-12 DIAGNOSIS — I48.91 A-FIB (HCC): ICD-10-CM

## 2025-05-12 DIAGNOSIS — I48.11 LONGSTANDING PERSISTENT ATRIAL FIBRILLATION (HCC): Primary | ICD-10-CM

## 2025-05-12 LAB
ACT BLD: 366 SECS (ref 79–138)
ECHO BSA: 1.88 M2
ECHO BSA: 1.88 M2

## 2025-05-12 PROCEDURE — 2580000003 HC RX 258

## 2025-05-12 PROCEDURE — 2709999900 HC NON-CHARGEABLE SUPPLY: Performed by: INTERNAL MEDICINE

## 2025-05-12 PROCEDURE — C1733 CATH, EP, OTHR THAN COOL-TIP: HCPCS | Performed by: INTERNAL MEDICINE

## 2025-05-12 PROCEDURE — 93319 3D ECHO IMG CGEN CAR ANOMAL: CPT

## 2025-05-12 PROCEDURE — 6360000002 HC RX W HCPCS

## 2025-05-12 PROCEDURE — 6370000000 HC RX 637 (ALT 250 FOR IP): Performed by: INTERNAL MEDICINE

## 2025-05-12 PROCEDURE — C1730 CATH, EP, 19 OR FEW ELECT: HCPCS | Performed by: INTERNAL MEDICINE

## 2025-05-12 PROCEDURE — 2500000003 HC RX 250 WO HCPCS: Performed by: INTERNAL MEDICINE

## 2025-05-12 PROCEDURE — C1760 CLOSURE DEV, VASC: HCPCS | Performed by: INTERNAL MEDICINE

## 2025-05-12 PROCEDURE — 6360000002 HC RX W HCPCS: Performed by: INTERNAL MEDICINE

## 2025-05-12 PROCEDURE — C1769 GUIDE WIRE: HCPCS | Performed by: INTERNAL MEDICINE

## 2025-05-12 PROCEDURE — 93656 COMPRE EP EVAL ABLTJ ATR FIB: CPT | Performed by: INTERNAL MEDICINE

## 2025-05-12 PROCEDURE — 93657 TX L/R ATRIAL FIB ADDL: CPT | Performed by: INTERNAL MEDICINE

## 2025-05-12 PROCEDURE — C1731 CATH, EP, 20 OR MORE ELEC: HCPCS | Performed by: INTERNAL MEDICINE

## 2025-05-12 PROCEDURE — 2580000003 HC RX 258: Performed by: INTERNAL MEDICINE

## 2025-05-12 PROCEDURE — 93312 ECHO TRANSESOPHAGEAL: CPT

## 2025-05-12 PROCEDURE — 76937 US GUIDE VASCULAR ACCESS: CPT | Performed by: INTERNAL MEDICINE

## 2025-05-12 PROCEDURE — C1759 CATH, INTRA ECHOCARDIOGRAPHY: HCPCS | Performed by: INTERNAL MEDICINE

## 2025-05-12 PROCEDURE — 3700000000 HC ANESTHESIA ATTENDED CARE: Performed by: INTERNAL MEDICINE

## 2025-05-12 PROCEDURE — C1766 INTRO/SHEATH,STRBLE,NON-PEEL: HCPCS | Performed by: INTERNAL MEDICINE

## 2025-05-12 PROCEDURE — C1894 INTRO/SHEATH, NON-LASER: HCPCS | Performed by: INTERNAL MEDICINE

## 2025-05-12 PROCEDURE — 3700000001 HC ADD 15 MINUTES (ANESTHESIA): Performed by: INTERNAL MEDICINE

## 2025-05-12 PROCEDURE — 85347 COAGULATION TIME ACTIVATED: CPT

## 2025-05-12 RX ORDER — PROCHLORPERAZINE EDISYLATE 5 MG/ML
5 INJECTION INTRAMUSCULAR; INTRAVENOUS
Status: DISCONTINUED | OUTPATIENT
Start: 2025-05-12 | End: 2025-05-13 | Stop reason: HOSPADM

## 2025-05-12 RX ORDER — AMIODARONE HYDROCHLORIDE 200 MG/1
200 TABLET ORAL DAILY
Qty: 90 TABLET | Refills: 0 | Status: SHIPPED | OUTPATIENT
Start: 2025-05-12 | End: 2025-08-10

## 2025-05-12 RX ORDER — SODIUM CHLORIDE 0.9 % (FLUSH) 0.9 %
5-40 SYRINGE (ML) INJECTION EVERY 12 HOURS SCHEDULED
Status: DISCONTINUED | OUTPATIENT
Start: 2025-05-12 | End: 2025-05-13 | Stop reason: HOSPADM

## 2025-05-12 RX ORDER — CARVEDILOL 6.25 MG/1
6.25 TABLET ORAL 2 TIMES DAILY WITH MEALS
Status: DISCONTINUED | OUTPATIENT
Start: 2025-05-12 | End: 2025-05-13 | Stop reason: HOSPADM

## 2025-05-12 RX ORDER — SODIUM CHLORIDE, SODIUM LACTATE, POTASSIUM CHLORIDE, CALCIUM CHLORIDE 600; 310; 30; 20 MG/100ML; MG/100ML; MG/100ML; MG/100ML
INJECTION, SOLUTION INTRAVENOUS
Status: DISCONTINUED | OUTPATIENT
Start: 2025-05-12 | End: 2025-05-12 | Stop reason: SDUPTHER

## 2025-05-12 RX ORDER — PROTAMINE SULFATE 10 MG/ML
INJECTION, SOLUTION INTRAVENOUS
Status: DISCONTINUED | OUTPATIENT
Start: 2025-05-12 | End: 2025-05-12 | Stop reason: SDUPTHER

## 2025-05-12 RX ORDER — SODIUM CHLORIDE 0.9 % (FLUSH) 0.9 %
5-40 SYRINGE (ML) INJECTION PRN
Status: DISCONTINUED | OUTPATIENT
Start: 2025-05-12 | End: 2025-05-13 | Stop reason: HOSPADM

## 2025-05-12 RX ORDER — FENTANYL CITRATE 50 UG/ML
50 INJECTION, SOLUTION INTRAMUSCULAR; INTRAVENOUS EVERY 5 MIN PRN
Status: DISCONTINUED | OUTPATIENT
Start: 2025-05-12 | End: 2025-05-13 | Stop reason: HOSPADM

## 2025-05-12 RX ORDER — PHENYLEPHRINE HCL IN 0.9% NACL 0.4MG/10ML
SYRINGE (ML) INTRAVENOUS
Status: DISCONTINUED | OUTPATIENT
Start: 2025-05-12 | End: 2025-05-12 | Stop reason: SDUPTHER

## 2025-05-12 RX ORDER — SODIUM CHLORIDE 9 MG/ML
INJECTION, SOLUTION INTRAVENOUS PRN
Status: DISCONTINUED | OUTPATIENT
Start: 2025-05-12 | End: 2025-05-13 | Stop reason: HOSPADM

## 2025-05-12 RX ORDER — FENTANYL CITRATE 50 UG/ML
INJECTION, SOLUTION INTRAMUSCULAR; INTRAVENOUS
Status: DISCONTINUED | OUTPATIENT
Start: 2025-05-12 | End: 2025-05-12 | Stop reason: SDUPTHER

## 2025-05-12 RX ORDER — TACROLIMUS 1 MG/1
2 CAPSULE ORAL ONCE
Status: COMPLETED | OUTPATIENT
Start: 2025-05-12 | End: 2025-05-12

## 2025-05-12 RX ORDER — MYCOPHENOLATE MOFETIL 250 MG/1
1000 CAPSULE ORAL 2 TIMES DAILY
Status: DISCONTINUED | OUTPATIENT
Start: 2025-05-12 | End: 2025-05-12

## 2025-05-12 RX ORDER — MYCOPHENOLATE MOFETIL 250 MG/1
250 CAPSULE ORAL 2 TIMES DAILY
Status: DISCONTINUED | OUTPATIENT
Start: 2025-05-12 | End: 2025-05-13 | Stop reason: HOSPADM

## 2025-05-12 RX ORDER — HEPARIN SODIUM 1000 [USP'U]/ML
INJECTION, SOLUTION INTRAVENOUS; SUBCUTANEOUS
Status: DISCONTINUED | OUTPATIENT
Start: 2025-05-12 | End: 2025-05-12 | Stop reason: SDUPTHER

## 2025-05-12 RX ORDER — GLYCOPYRROLATE 0.2 MG/ML
INJECTION INTRAMUSCULAR; INTRAVENOUS
Status: DISCONTINUED | OUTPATIENT
Start: 2025-05-12 | End: 2025-05-12 | Stop reason: SDUPTHER

## 2025-05-12 RX ORDER — MIDAZOLAM HYDROCHLORIDE 1 MG/ML
INJECTION, SOLUTION INTRAMUSCULAR; INTRAVENOUS
Status: DISCONTINUED | OUTPATIENT
Start: 2025-05-12 | End: 2025-05-12 | Stop reason: SDUPTHER

## 2025-05-12 RX ORDER — ONDANSETRON 2 MG/ML
INJECTION INTRAMUSCULAR; INTRAVENOUS
Status: DISCONTINUED | OUTPATIENT
Start: 2025-05-12 | End: 2025-05-12 | Stop reason: SDUPTHER

## 2025-05-12 RX ORDER — HYDROMORPHONE HYDROCHLORIDE 1 MG/ML
0.25 INJECTION, SOLUTION INTRAMUSCULAR; INTRAVENOUS; SUBCUTANEOUS EVERY 5 MIN PRN
Status: DISCONTINUED | OUTPATIENT
Start: 2025-05-12 | End: 2025-05-13 | Stop reason: HOSPADM

## 2025-05-12 RX ORDER — NALOXONE HYDROCHLORIDE 0.4 MG/ML
INJECTION, SOLUTION INTRAMUSCULAR; INTRAVENOUS; SUBCUTANEOUS PRN
Status: DISCONTINUED | OUTPATIENT
Start: 2025-05-12 | End: 2025-05-13 | Stop reason: HOSPADM

## 2025-05-12 RX ADMIN — SODIUM CHLORIDE, POTASSIUM CHLORIDE, SODIUM LACTATE AND CALCIUM CHLORIDE: 600; 310; 30; 20 INJECTION, SOLUTION INTRAVENOUS at 15:06

## 2025-05-12 RX ADMIN — PROTAMINE SULFATE 100 MG: 10 INJECTION, SOLUTION INTRAVENOUS at 16:05

## 2025-05-12 RX ADMIN — FENTANYL CITRATE 25 MCG: 50 INJECTION, SOLUTION INTRAMUSCULAR; INTRAVENOUS at 15:32

## 2025-05-12 RX ADMIN — HEPARIN SODIUM 15000 UNITS: 1000 INJECTION, SOLUTION INTRAVENOUS; SUBCUTANEOUS at 15:49

## 2025-05-12 RX ADMIN — GLYCOPYRROLATE 0.4 MG: 0.2 INJECTION, SOLUTION INTRAMUSCULAR; INTRAVENOUS at 15:18

## 2025-05-12 RX ADMIN — TACROLIMUS 2 MG: 1 CAPSULE ORAL at 21:12

## 2025-05-12 RX ADMIN — FENTANYL CITRATE 25 MCG: 50 INJECTION, SOLUTION INTRAMUSCULAR; INTRAVENOUS at 15:21

## 2025-05-12 RX ADMIN — ONDANSETRON HYDROCHLORIDE 4 MG: 2 INJECTION, SOLUTION INTRAMUSCULAR; INTRAVENOUS at 15:50

## 2025-05-12 RX ADMIN — FENTANYL CITRATE 50 MCG: 50 INJECTION, SOLUTION INTRAMUSCULAR; INTRAVENOUS at 16:09

## 2025-05-12 RX ADMIN — PHENYLEPHRINE HYDROCHLORIDE 20 MCG/MIN: 10 INJECTION INTRAVENOUS at 15:32

## 2025-05-12 RX ADMIN — CARVEDILOL 6.25 MG: 6.25 TABLET, FILM COATED ORAL at 21:11

## 2025-05-12 RX ADMIN — Medication 80 MCG: at 15:52

## 2025-05-12 RX ADMIN — MYCOPHENOLATE MOFETIL 250 MG: 250 CAPSULE ORAL at 22:32

## 2025-05-12 RX ADMIN — MIDAZOLAM HYDROCHLORIDE 2 MG: 1 INJECTION, SOLUTION INTRAMUSCULAR; INTRAVENOUS at 15:09

## 2025-05-12 RX ADMIN — PROPOFOL 75 MCG/KG/MIN: 10 INJECTION, EMULSION INTRAVENOUS at 15:18

## 2025-05-12 NOTE — ANESTHESIA PRE PROCEDURE
Department of Anesthesiology  Preprocedure Note       Name:  Anitha Ta   Age:  79 y.o.  :  1945                                          MRN:  [id number]         Date:  2025      Surgeon: Surgeon(s):  Jl Vicente MD    Procedure: Procedure(s):  Ablation A-fib w complete ep study    Medications prior to admission:   Prior to Admission medications    Medication Sig Start Date End Date Taking? Authorizing Provider   amiodarone (CORDARONE) 200 MG tablet Take 1 tablet by mouth 2 times daily for 21 days 6/3/24 6/24/24  Jl Vicente MD   rivaroxaban (XARELTO) 20 MG TABS tablet Take 1 tablet by mouth daily (with breakfast) 3/11/24   Jl Vicente MD   amLODIPine (NORVASC) 5 MG tablet Take 1 tablet by mouth daily    Italia Ruiz MD   atorvastatin (LIPITOR) 40 MG tablet Take 1 tablet by mouth daily    ProviderItalia MD   carvedilol (COREG) 6.25 MG tablet Take 1 tablet by mouth 2 times daily 10/24/23   ProviderItalia MD   vitamin D 25 MCG (1000 UT) CAPS Take 1 capsule by mouth daily    Automatic Reconciliation, Ar   cloNIDine (CATAPRES) 0.2 MG/24HR PTWK Place 1 patch onto the skin    Automatic Reconciliation, Ar   mycophenolate (CELLCEPT) 250 MG capsule Take 1 capsule by mouth 2 times daily    Automatic Reconciliation, Ar   omeprazole (PRILOSEC OTC) 20 MG tablet Take 1 tablet by mouth daily    Automatic Reconciliation, Ar   potassium chloride (KLOR-CON M) 20 MEQ extended release tablet Take 1 tablet by mouth 2 times daily 2 tab daily    Automatic Reconciliation, Ar   predniSONE (DELTASONE) 5 MG tablet Take 2.5 mg by mouth daily  Patient not taking: Reported on 2024    Automatic Reconciliation, Ar   tacrolimus (PROGRAF) 1 MG capsule Take 1 capsule by mouth 2 times daily 1 tab at 10:30 a.m and 2 tabs at 8:30 p.m    Automatic Reconciliation, Ar       Current medications:    No current facility-administered medications for this encounter.       Allergies:

## 2025-05-12 NOTE — PROGRESS NOTES
Cardiac Cath Lab Recovery Arrival Note:      Anitha PARKS May arrived to Cardiac Cath Lab, Recovery Area. Staff introduced to patient. Patient identifiers verified with NAME and DATE OF BIRTH. Procedure verified with patient. Consent forms reviewed and signed by patient or authorized representative and verified. Allergies verified.     Patient and family oriented to department. Patient and family informed of procedure and plan of care.     Questions answered with review. Patient prepped for procedure, per orders from physician, prior to arrival.    Patient on cardiac monitor, non-invasive blood pressure, SPO2 monitor. On RA. Patient is A&Ox 4. Patient reports NO PAIN.     Patient in stretcher, in low position, with side rails up, call bell within reach, patient instructed to call if assistance as needed.    Patient prep in: [facility] Recovery Area, [place] [address].   Patient family has pager #   Family in: .   Prep by: ALVINO .

## 2025-05-12 NOTE — PROGRESS NOTES
1710  Pt arrived to unit. Bilateral groin sites CDI, soft to palpation. +2 pedal pulses. Pt denies any pain, numbness, or tingling. VSS. Bedrest until 2030.

## 2025-05-12 NOTE — DISCHARGE INSTRUCTIONS
[facility]  [address] [place] [address]  [phone]  [email or web address]       ATRIAL FIBRILLATION ABLATION DISCHARGE INSTRUCTIONS    Patient ID:  Anitha Ta  [id number]  79 y.o.  1945    Admit Date: 5/12/2025    Discharge Date: 5/12/2025     Admitting Physician: [unfilled]     Discharge Physician: [unfilled]    Admission Diagnoses:   A-fib (HCC) [I48.91]    Discharge Diagnoses:   [unfilled]    Discharge Condition: Good    Cardiology Procedures this Admission:  Atrial fibrillation.     Disposition: home    Reference discharge instructions provided by nursing for diet and activity.    Follow-up with Dr Vicente or his Nurse Practitioners in 1-2 weeks.  Call [phone] to make an appointment.    Signed:  Jl Vicente MD  5/12/2025  4:07 PM    S/P ATRIAL FIBRILLATION ABLATION DISCHARGE INSTRUCTIONS    It is normal to feel tired the first couple days.  Take it easy and follow the physician’s instructions.      CHECK THE CATHETER INSERTION SITE DAILY:  You may shower 24 hours after the procedure, remove the bandage during showering.  Wash with soap and water and pat dry.  Gentle cleaning of the site with soap and water is sufficient, cover with a dry clean dressing or bandage.  Do not apply creams or powders to the area.  Do not sit in a bathtub or pool of water for 7 days or until wound has completely healed.  Temporary bruising and discomfort is normal and may last a few weeks.  You may have a  formation of a small lump at the site which may last up to 6 weeks.    CALL THE PHYSICIAN:  If the site becomes red, swollen or feels warm to the touch  If there is bleeding or drainage or if there is unusual pain at the groin or down the leg.  If there is any bleeding, lie down, apply pressure or have someone apply pressure with a clean cloth until the bleeding stops.  If the bleeding continues, call [phone] to be transported to the hospital.  DO NOT DRIVE YOURSELF, OR

## 2025-05-13 VITALS
HEIGHT: 65 IN | RESPIRATION RATE: 15 BRPM | WEIGHT: 170 LBS | SYSTOLIC BLOOD PRESSURE: 110 MMHG | TEMPERATURE: 97.8 F | OXYGEN SATURATION: 97 % | HEART RATE: 55 BPM | BODY MASS INDEX: 28.32 KG/M2 | DIASTOLIC BLOOD PRESSURE: 61 MMHG

## 2025-05-13 PROCEDURE — 2500000003 HC RX 250 WO HCPCS: Performed by: INTERNAL MEDICINE

## 2025-05-13 PROCEDURE — 6360000002 HC RX W HCPCS: Performed by: INTERNAL MEDICINE

## 2025-05-13 PROCEDURE — 51798 US URINE CAPACITY MEASURE: CPT

## 2025-05-13 PROCEDURE — 51701 INSERT BLADDER CATHETER: CPT

## 2025-05-13 PROCEDURE — 6370000000 HC RX 637 (ALT 250 FOR IP): Performed by: INTERNAL MEDICINE

## 2025-05-13 PROCEDURE — 2500000003 HC RX 250 WO HCPCS: Performed by: ANESTHESIOLOGY

## 2025-05-13 RX ADMIN — SODIUM CHLORIDE, PRESERVATIVE FREE 10 ML: 5 INJECTION INTRAVENOUS at 08:54

## 2025-05-13 RX ADMIN — CARVEDILOL 6.25 MG: 6.25 TABLET, FILM COATED ORAL at 08:50

## 2025-05-13 RX ADMIN — MYCOPHENOLATE MOFETIL 250 MG: 250 CAPSULE ORAL at 11:03

## 2025-05-13 NOTE — PROGRESS NOTES

## 2025-05-13 NOTE — PROGRESS NOTES
0710 Bedside shift change report given to Arianna RN (oncoming nurse) by Kori RN (offgoing nurse). Report included the following information Nurse Handoff Report, MAR, Recent Results, and Cardiac Rhythm NSR/Sinus Shashank .

## 2025-05-13 NOTE — PROGRESS NOTES
2010: Figure 8 Stitch removed from Right Femoral Site. QuikClot and Tegaderm dressing applied. Minor oozing to the site with stitch removal. Site is CDI with dressing applied.     2145: Patient is up to the side of the bed with no bleeding or complications with the site.     2155: Patient bleeding from both Left and Right Femoral Sites. Bed Rest resuming.     2208: Paged [facility] to inform on-call provider that the patient will be staying the night and to see if any other orders need to be placed.     2215: MD Gallagher answered all questions. Patient will stay the night and continue to be monitored for any bleeding. Orders placed for patient's home medications for the evening. Patient has specific kidney transplant medications that must be taken everyday. Verified medications and dosages with Raul Chavez.     0140: Both QuikClot and Tegaderm dressings replaced on Left and Right Femoral Site. No bleeding or drainage on Left Femoral Site. Oozing on Right Femoral Site. Manual pressure held and new dressing applied. Patient remains in bed.     0440: Patient unable to void. Bladder Scan read 999+. Straight Catheterization resulted in 1275 mL drained from the bladder. Patient stated last time she had an Ablation, she could not void either and had to be straight catheterized.     0602: Patient up to the side of the bed with no bleeding. Patient stood and walked by the bed with no bleeding issues. Discussed with the patient that I would speak with MD before discharging due to bleeding episode and urinary retention.     End of Shift Note    Bedside shift change report given to SHIKHA Keller (oncoming nurse) by Armaan Omalley RN (offgoing nurse).  Report included the following information SBAR, Procedure Summary, Intake/Output, MAR, Med Rec Status, Cardiac Rhythm Sinus Shashank, Quality Measures, and Dual Neuro Assessment    Shift worked: 7:00 PM-7:30 AM     Shift summary and any significant changes:    See

## 2025-05-13 NOTE — PLAN OF CARE
Problem: Discharge Planning  Goal: Discharge to home or other facility with appropriate resources  5/13/2025 1334 by Arianna Veloz RN  Outcome: Adequate for Discharge  5/13/2025 1051 by Arianna Veloz RN  Outcome: Progressing  Flowsheets (Taken 5/13/2025 1051)  Discharge to home or other facility with appropriate resources:   Identify barriers to discharge with patient and caregiver   Identify discharge learning needs (meds, wound care, etc)     Problem: Safety - Adult  Goal: Free from fall injury  5/13/2025 1334 by Arianna Veloz RN  Outcome: Adequate for Discharge  5/13/2025 1051 by Arianna Veloz RN  Outcome: Progressing  Flowsheets (Taken 5/13/2025 1051)  Free From Fall Injury:   Instruct family/caregiver on patient safety   Based on caregiver fall risk screen, instruct family/caregiver to ask for assistance with transferring infant if caregiver noted to have fall risk factors     Problem: ABCDS Injury Assessment  Goal: Absence of physical injury  5/13/2025 1334 by Arianna Veloz RN  Outcome: Adequate for Discharge  5/13/2025 1051 by Arianna eVloz RN  Outcome: Progressing  Flowsheets (Taken 5/13/2025 1051)  Absence of Physical Injury: Implement safety measures based on patient assessment

## 2025-05-13 NOTE — PLAN OF CARE
Problem: Discharge Planning  Goal: Discharge to home or other facility with appropriate resources  5/13/2025 1051 by Arianna Veloz RN  Outcome: Progressing  Flowsheets (Taken 5/13/2025 1051)  Discharge to home or other facility with appropriate resources:   Identify barriers to discharge with patient and caregiver   Identify discharge learning needs (meds, wound care, etc)  5/12/2025 2312 by Armaan Omalley RN  Outcome: Progressing     Problem: Safety - Adult  Goal: Free from fall injury  5/13/2025 1051 by Arianna Veloz RN  Outcome: Progressing  Flowsheets (Taken 5/13/2025 1051)  Free From Fall Injury:   Instruct family/caregiver on patient safety   Based on caregiver fall risk screen, instruct family/caregiver to ask for assistance with transferring infant if caregiver noted to have fall risk factors  5/12/2025 2312 by Armaan Omalley, RN  Outcome: Progressing     Problem: ABCDS Injury Assessment  Goal: Absence of physical injury  Outcome: Progressing  Flowsheets (Taken 5/13/2025 1051)  Absence of Physical Injury: Implement safety measures based on patient assessment

## 2025-05-14 NOTE — ANESTHESIA POSTPROCEDURE EVALUATION
Department of Anesthesiology  Postprocedure Note    Patient: Anitha Ta  MRN: [id number]  YOB: 1945  Date of evaluation: 5/14/2025    Procedure Summary       Date: 05/12/25 Room / Location: [facility] EP [facility] / [facility] CARD[facility] C[facility]    Anesthesia Start: 1513 Anesthesia Stop: 1630    Procedures:       Ablation A-fib w complete ep study      Ablation following A-fib addl      Ultrasound guided vascular access Diagnosis: Longstanding persistent atrial fibrillation (HCC)    Providers: Jl Vicente MD Responsible Provider: Asif Church MD    Anesthesia Type: MAC ASA Status: 3            Anesthesia Type: MAC    Stephane Phase I: Stephane Score: 8    Stephane Phase II:      Anesthesia Post Evaluation    Patient location during evaluation: PACU  Patient participation: complete - patient participated  Level of consciousness: awake and alert  Airway patency: patent  Nausea & Vomiting: no nausea  Cardiovascular status: hemodynamically stable  Respiratory status: acceptable  Hydration status: euvolemic        There were no known notable events for this encounter.

## 2025-07-17 ENCOUNTER — OFFICE VISIT (OUTPATIENT)
Age: 80
End: 2025-07-17
Payer: MEDICARE

## 2025-07-17 VITALS
DIASTOLIC BLOOD PRESSURE: 82 MMHG | HEART RATE: 71 BPM | BODY MASS INDEX: 29.09 KG/M2 | HEIGHT: 65 IN | WEIGHT: 174.6 LBS | SYSTOLIC BLOOD PRESSURE: 127 MMHG

## 2025-07-17 DIAGNOSIS — M81.0 AGE-RELATED OSTEOPOROSIS WITHOUT CURRENT PATHOLOGICAL FRACTURE: Primary | ICD-10-CM

## 2025-07-17 PROCEDURE — G8399 PT W/DXA RESULTS DOCUMENT: HCPCS | Performed by: INTERNAL MEDICINE

## 2025-07-17 PROCEDURE — 3074F SYST BP LT 130 MM HG: CPT | Performed by: INTERNAL MEDICINE

## 2025-07-17 PROCEDURE — 3079F DIAST BP 80-89 MM HG: CPT | Performed by: INTERNAL MEDICINE

## 2025-07-17 PROCEDURE — 1036F TOBACCO NON-USER: CPT | Performed by: INTERNAL MEDICINE

## 2025-07-17 PROCEDURE — G2211 COMPLEX E/M VISIT ADD ON: HCPCS | Performed by: INTERNAL MEDICINE

## 2025-07-17 PROCEDURE — 1126F AMNT PAIN NOTED NONE PRSNT: CPT | Performed by: INTERNAL MEDICINE

## 2025-07-17 PROCEDURE — 1090F PRES/ABSN URINE INCON ASSESS: CPT | Performed by: INTERNAL MEDICINE

## 2025-07-17 PROCEDURE — G8419 CALC BMI OUT NRM PARAM NOF/U: HCPCS | Performed by: INTERNAL MEDICINE

## 2025-07-17 PROCEDURE — 1123F ACP DISCUSS/DSCN MKR DOCD: CPT | Performed by: INTERNAL MEDICINE

## 2025-07-17 PROCEDURE — G8428 CUR MEDS NOT DOCUMENT: HCPCS | Performed by: INTERNAL MEDICINE

## 2025-07-17 PROCEDURE — 99214 OFFICE O/P EST MOD 30 MIN: CPT | Performed by: INTERNAL MEDICINE

## 2025-07-17 NOTE — PROGRESS NOTES
mycophenolate (CELLCEPT) 250 MG capsule, Take 1 capsule by mouth 2 times daily, Disp: , Rfl:     omeprazole (PRILOSEC OTC) 20 MG tablet, Take 1 tablet by mouth daily, Disp: , Rfl:     potassium chloride (KLOR-CON M) 20 MEQ extended release tablet, Take 1 tablet by mouth 2 times daily 2 tab daily, Disp: , Rfl:     predniSONE (DELTASONE) 2.5 MG tablet, Take 1 tablet by mouth daily, Disp: , Rfl:     tacrolimus (PROGRAF) 1 MG capsule, Take 1 capsule by mouth 2 times daily 1 tab at 10:30 a.m and 2 tabs at 8:30 p.m, Disp: , Rfl:         Social Hx: smoked when younger- stopped in 30s   Lives closer to [facility]       Review of Systems:           Physical Examination:  /82   Pulse 71   Ht 1.651 m (5' 5\")   Wt 79.2 kg (174 lb 9.6 oz)   BMI 29.05 kg/m²     - GENERAL: NCAT, Appears well nourished   - EYES: EOMI, non-icteric, no proptosis   - Ear/Nose/Throat: NCAT, no visible inflammation or masses   - CARDIOVASCULAR: no cyanosis, no visible JVD   - RESPIRATORY: respiratory effort normal without  any distress or labored breathing   - MUSCULOSKELETAL: Normal ROM of neck and upper extremities observed   - SKIN: No rash on face  - NEUROLOGIC:  No facial asymmetry (Cranial nerve 7 motor function), No gaze palsy   - PSYCHIATRIC: Normal  affect, Normal insight and judgement       Data Reviewed:            11/2020 DEXA:                        Assessment/Plan: This is a very pleasant 80 y.o. female with a past medical history significant for autosomal dominant polycystic kidney disease status post renal transplant in January 2016 presenting in follow-up for discussion related to medication management  of this condition.  Anitha remains on prednisone.  Her previous 10-year risk of hip fracture was 11.8%; we treat osteopenia when this risk exceeds 3%.  We have supplemented vitamin D and Anitha continues to take a maintenance dose.  There is some degree  of secondary hyperparathyroidism present leading to the elevated PTH.

## (undated) DEVICE — KIT ELECTRD SURF FOR DISPLAYING THE 3D POS OF EP CATH

## (undated) DEVICE — STERILE (15.2 TAPERED TO 7.6 X 183CM) POLYETHYLENE ACCORDION-FOLDED COVER FOR USE WITH SIEMENS ACUNAV ULTRASOUND CATHETER FAMILY CONNECTOR: Brand: SWIFTLINK TRANSDUCER COVER

## (undated) DEVICE — CATHETER CONNECTION CABLE: Brand: FARASTAR™

## (undated) DEVICE — CATHETER EP L120CM OD6FR 2-5-2MM SPC CRD QPLR ELECTRD

## (undated) DEVICE — PRESSURE MONITORING SET: Brand: TRUWAVE

## (undated) DEVICE — HEART CATH-MRMC: Brand: MEDLINE INDUSTRIES, INC.

## (undated) DEVICE — PULSED FIELD ABLATION CATHETER: Brand: FARAWAVE™

## (undated) DEVICE — SUTURE PERMAHAND SZ 0 L30IN NONABSORBABLE BLK L26MM SH 1/2 K834H

## (undated) DEVICE — ELECTRODE PT RET AD L9FT HI MOIST COND ADH HYDRGEL CORDED

## (undated) DEVICE — PINNACLE INTRODUCER SHEATH: Brand: PINNACLE

## (undated) DEVICE — 1 X VERSACROSS CONNECT TRANSSEPTAL DILATOR;  1 X VERSACROSS RF WIRE (INCLUDING 1 X CONNECTOR CABLE (SINGLE USE)): Brand: VERSACROSS CONNECT ACCESS SOLUTION FOR FARADRIVE

## (undated) DEVICE — CATHETER REPROC ULTRASOUND 10 FR ACUSON ACUNAV

## (undated) DEVICE — GUIDEWIRE VASC L180CM 0035IN 15MM J PTFE ROSEN TIP CRV FIX

## (undated) DEVICE — CABLE EP L150CM BLK HEXAPOLAR OCTAPOLAR DECAPOLAR EXTN CONN

## (undated) DEVICE — CABLE EP L150CM RED CONNECTS W/ 4FR SUPREME BPLR QPLR CATH

## (undated) DEVICE — STEERABLE SHEATH CLEAR: Brand: FARADRIVE™

## (undated) DEVICE — 3M™ TEGADERM™ TRANSPARENT FILM DRESSING FRAME STYLE, 1626W, 4 IN X 4-3/4 IN (10 CM X 12 CM), 50/CT 4CT/CASE: Brand: 3M™ TEGADERM™

## (undated) DEVICE — CATHETER EP LG 2-8-2 MM 7 FRX95 CM LIVEWIRE

## (undated) DEVICE — DRESSING HEMOSTATIC INTVENT W/O SLT QUIKCLOT

## (undated) DEVICE — MEDI-TRACE CADENCE ADULT, DEFIBRILLATION ELECTRODE -RTS  (10 PR/PK) - PHYSIO-CONTROL: Brand: MEDI-TRACE CADENCE

## (undated) DEVICE — SYSTEM CLOSURE 6-12 FR VEN VASC VASCADE MVP

## (undated) DEVICE — CABLE EP L150CM RED HEXAPOLAR OCTAPOLAR DECAPOLAR EXTN CONN

## (undated) DEVICE — PROVE COVER: Brand: UNBRANDED

## (undated) DEVICE — CATHETER US 10FR L90CM HI RESOL 4 W STEERING PRECIS DOPP